# Patient Record
Sex: FEMALE | Race: WHITE
[De-identification: names, ages, dates, MRNs, and addresses within clinical notes are randomized per-mention and may not be internally consistent; named-entity substitution may affect disease eponyms.]

---

## 2018-06-11 NOTE — ER
Nurse's Notes                                                                                     

 [facility]                                                                

Name: Re Jeong                                                                                

Age: 73 yrs                                                                                       

Sex: Female                                                                                       

: 1945                                                                                   

MRN: [id number]                                                                                   

Arrival Date: 2018                                                                          

Time: 09:48                                                                                       

Account#: [id number]                                                                            

Bed 8                                                                                             

Private MD: Jcarlos Hammond F                                                                   

Diagnosis: Headache                                                                               

                                                                                                  

Presentation:                                                                                     

                                                                                             

09:50 Presenting complaint: Patient states: Headache x 2 days. Denies photosensitivity, N/V.  hb  

      Hx migraines, chronic headache. Today headache feels worse than her typical headache.       

      Was instructed by Dr. Hammond to come to ED. Transition of care: patient was not           

      received from another setting of care. Onset of symptoms was Anu 10, 2018. Risk            

      Assessment: Do you want to hurt yourself or someone else? Patient reports no desire to      

      harm self or others. Initial Sepsis Screen: Does the patient meet any 2 criteria? No.       

      Patient's initial sepsis screen is negative. Does the patient have a suspected source       

      of infection? No. Patient's initial sepsis screen is negative. Care prior to arrival:       

      None.                                                                                       

09:50 Method Of Arrival: Ambulatory                                                           hb  

09:50 Acuity: YADIRA 3                                                                           hb  

                                                                                                  

Triage Assessment:                                                                                

09:55 Headache History: The patient has had previous headaches and this one is more severe    sv  

      than previous episodes.                                                                     

                                                                                                  

Historical:                                                                                       

- Allergies:                                                                                      

09:55 No Known Allergies;                                                                     hb  

- Home Meds:                                                                                      

09:55 Magnesium Oxide Oral [Active]; Fish Oil oral oral [Active]; aspirin 325 mg Oral tab 1   hb  

      tab once daily [Active]; metoprolol tartrate 25 mg Oral tab 1 tab once daily [Active];      

      donepezil 23 mg oral tab 1 tab once daily [Active]; Namzaric 28-10 mg oral CSpX 1 cap       

      once daily [Active]; losartan 100 mg oral tab 1 tab once daily [Active]; levothyroxine      

      50 mcg tab 1 tab once daily [Active];                                                       

- PSHx:                                                                                           

09:55 Knee - LEFT; Heart stents; Shoulder - RIGHT; Cholecystectomy;                           hb  

                                                                                                  

- Immunization history:: Adult Immunizations up to date.                                          

- Social history:: Smoking status: Patient/guardian denies using tobacco.                         

- Ebola Screening: : No symptoms or risks identified at this time.                                

                                                                                                  

                                                                                                  

Screenin:57 Abuse screen: Denies threats or abuse. Denies injuries from another. Nutritional        sv  

      screening: No deficits noted. Tuberculosis screening: No symptoms or risk factors           

      identified. Fall Risk None identified.                                                      

                                                                                                  

Assessment:                                                                                       

09:55 General: Appears in no apparent distress. uncomfortable, Behavior is calm, cooperative, sv  

      appropriate for age. Pain: Complains of pain in forehead, right eye, right cheek, left      

      cheek and left eye Pain currently is 8 out of 10 on a pain scale. Quality of pain is        

      described as throbbing, Pain began 2-3 days ago. Is continuous, Current management - is     

      no interventions. Neuro: Level of Consciousness is awake, alert, obeys commands,            

      Oriented to person, place, time, situation, Moves all extremities. Full function Gait       

      is steady, Speech is normal, Facial symmetry appears normal. Respiratory: Respiratory       

      effort is even, unlabored, Respiratory pattern is regular, symmetrical. : No signs        

      and/or symptoms were reported regarding the genitourinary system. Derm: Skin is normal.     

      Musculoskeletal: No signs and/or symptoms reported regarding the musculoskeletal system.    

12:18 Reassessment: Patient appears in no apparent distress at this time. Patient is alert,   dm5 

      oriented x 3, equal unlabored respirations, skin warm/dry/pink.                             

                                                                                                  

Vital Signs:                                                                                      

09:52  / 76; Pulse 52; Resp 16; Temp 98.2; Pulse Ox 99% on R/A; Weight 68.95 kg; Height hb  

      5 ft. 5 in. (165.10 cm); Pain 8/10;                                                         

10:33  / 59; Pulse 52; Resp 18; Pulse Ox 95% on R/A;                                    sv  

12:18 Pain 4/10;                                                                              dm5 

09:52 Body Mass Index 25.29 (68.95 kg, 165.10 cm)                                             hb  

                                                                                                  

ED Course:                                                                                        

09:48 Patient arrived in ED.                                                                  sb2 

09:49 Jcarlos Hammond MD is Private Physician.                                              sb2 

09:52 Triage completed.                                                                       hb  

09:55 Arm band placed on left wrist.                                                          hb  

09:57 Judy Rodriguez RN is Primary Nurse.                                                  sv  

09:57 Patient has correct armband on for positive identification. Bed in low position. Adult  sv  

      w/ patient.                                                                                 

09:59 Deon Jeffries MD is Attending Physician.                                              kdr 

10:26 Chem 7 Sent.                                                                            sv  

10:26 CBC with Diff Sent.                                                                     sv  

10:33 Initial lab(s) drawn, by me, sent to lab. Inserted saline lock: 20 gauge in left        sv  

      antecubital area, using aseptic technique. Blood collected.                                 

10:46 CT completed. Patient tolerated procedure well. Patient moved to CT via stretcher.      mw3 

      Patient moved back from CT.                                                                 

10:49 CT Head Brain wo Cont In Process Unspecified.                                           EDMS

11:27 Jcarlos Hammond MD is Referral Physician.                                             kdr 

12:18 No provider procedures requiring assistance completed. IV discontinued, intact,         dm5 

      bleeding controlled, No redness/swelling at site. Pressure dressing applied.                

                                                                                                  

Administered Medications:                                                                         

12:13 Drug: traMADol 50 mg Route: PO;                                                         dm5 

12:18 Follow up: Response: No adverse reaction; Medication administered at discharge.         dm5 

                                                                                                  

                                                                                                  

Outcome:                                                                                          

11:27 Discharge ordered by MD.                                                                kdr 

12:18 Discharged to home ambulatory, with family.                                             dm5 

12:18 Condition: good                                                                             

12:18 Discharge instructions given to patient, family, Instructed on discharge instructions,      

      follow up and referral plans. medication usage, Demonstrated understanding of               

      instructions, follow-up care, medications, Prescriptions given X 2.                         

12:19 Patient left the ED.                                                                    dm5 

                                                                                                  

Signatures:                                                                                       

Dispatcher [employer]                           EDMS                                                 

Stephy Acosta, RN                    RN   dm5                                                  

Judy Rodriguez RN                    RN   Deon Garcia MD MD kdr Baxter, Heather, COCO                     RN   Sloane Orr                               sb2                                                  

Kierra Kaminski                             mw3                                                  

                                                                                                  

**************************************************************************************************

## 2018-06-11 NOTE — EDPHYS
Physician Documentation                                                                           

 [facility]                                                                

Name: Re Jeong                                                                                

Age: 73 yrs                                                                                       

Sex: Female                                                                                       

: 1945                                                                                   

MRN: [id number]                                                                                   

Arrival Date: 2018                                                                          

Time: 09:48                                                                                       

Account#: [id number]                                                                            

Bed 8                                                                                             

Private MD: Jcarlos Hammond F                                                                   

ED Physician Deon Jeffries                                                                       

HPI:                                                                                              

                                                                                             

10:11 This 73 yrs old  Female presents to ER via Ambulatory with complaints of       kdr 

      Headache.                                                                                   

10:11 The patient complains of pain to the forehead, right eye, right cheek, nose, left cheek kdr 

      and left eye. The patient describes the headache as aching, constant, a pressure,           

      unrelenting. Onset: The symptoms/episode began/occurred The is an ongoing HA for months     

      if not years that has become mildly worse in the last two days. Her last imaging was        

      five years ago. There has not been any abrupt changes in the severity or location of        

      the HA. it is persistently in her face - more upper than lower.. Associated signs and       

      symptoms: The patient has no apparent associated signs or symptoms. Severity of             

      symptoms: At its worst the pain was moderate, in the emergency department the pain is       

      unchanged. Headache History: Other More sever the last two days bu the patient does not     

      appear in any acute distress. She is sitting on the bedside comfortably. The symptoms       

      are alleviated by nothing. the symptoms are aggravated by nothing. The patient has          

      experienced similar episodes in the past, chronically. The patient has been recently        

      seen by a physician: Dr. Hammond.                                                          

                                                                                                  

Historical:                                                                                       

- Allergies:                                                                                      

09:55 No Known Allergies;                                                                     hb  

- Home Meds:                                                                                      

09:55 Magnesium Oxide Oral [Active]; Fish Oil oral oral [Active]; aspirin 325 mg Oral tab 1   hb  

      tab once daily [Active]; metoprolol tartrate 25 mg Oral tab 1 tab once daily [Active];      

      donepezil 23 mg oral tab 1 tab once daily [Active]; Namzaric 28-10 mg oral CSpX 1 cap       

      once daily [Active]; losartan 100 mg oral tab 1 tab once daily [Active]; levothyroxine      

      50 mcg tab 1 tab once daily [Active];                                                       

- PSHx:                                                                                           

09:55 Knee - LEFT; Heart stents; Shoulder - RIGHT; Cholecystectomy;                           hb  

                                                                                                  

- Immunization history:: Adult Immunizations up to date.                                          

- Social history:: Smoking status: Patient/guardian denies using tobacco.                         

- Ebola Screening: : No symptoms or risks identified at this time.                                

                                                                                                  

                                                                                                  

ROS:                                                                                              

10:11 Constitutional: Negative for fever, chills, and weight loss, Eyes: Negative for injury, kdr 

      pain, redness, and discharge, ENT: Negative for injury, pain, and discharge, Neck:          

      Negative for injury, pain, and swelling, Cardiovascular: Negative for chest pain,           

      palpitations, and edema, Respiratory: Negative for shortness of breath, cough,              

      wheezing, and pleuritic chest pain, Abdomen/GI: Negative for abdominal pain, nausea,        

      vomiting, diarrhea, and constipation, Back: Negative for injury and pain, : Negative      

      for injury, bleeding, discharge, and swelling, MS/Extremity: Negative for injury and        

      deformity, Skin: Negative for injury, rash, and discoloration, Psych: Negative for          

      depression, anxiety, suicide ideation, homicidal ideation, and hallucinations,              

      Allergy/Immunology: Negative for hives, rash, and allergies, Endocrine: Negative for        

      neck swelling, polydipsia, polyuria, polyphagia, and marked weight changes,                 

      Hematologic/Lymphatic: Negative for swollen nodes, abnormal bleeding, and unusual           

      bruising.                                                                                   

10:11 Neuro: Positive for headache, Negative for altered mental status, dizziness, gait           

      disturbance, hearing loss, loss of consciousness, numbness, seizure activity, speech        

      changes, syncope, near syncope, tingling, tinnitus, tremor, visual changes, weakness.       

                                                                                                  

Exam:                                                                                             

10:11 Constitutional:  This is a well developed, well nourished patient who is awake, alert,  kdr 

      and in no acute distress. Head/Face:  Normocephalic, atraumatic. Eyes:  Pupils equal        

      round and reactive to light, extra-ocular motions intact.  Lids and lashes normal.          

      Conjunctiva and sclera are non-icteric and not injected.  Cornea within normal limits.      

      Periorbital areas with no swelling, redness, or edema. Neck:  Trachea midline, no           

      thyromegaly or masses palpated, and no cervical lymphadenopathy.  Supple, full range of     

      motion without nuchal rigidity, or vertebral point tenderness.  No Meningismus.             

      Chest/axilla:  Normal chest wall appearance and motion.  Nontender with no deformity.       

      No lesions are appreciated. Cardiovascular:  Regular rate and rhythm with a normal S1       

      and S2.  No gallops, murmurs, or rubs.  Normal PMI, no JVD.  No pulse deficits.             

      Respiratory:  Lungs have equal breath sounds bilaterally, clear to auscultation and         

      percussion.  No rales, rhonchi or wheezes noted.  No increased work of breathing, no        

      retractions or nasal flaring. Abdomen/GI:  Soft, non-tender, with normal bowel sounds.      

      No distension or tympany.  No guarding or rebound.  No evidence of tenderness               

      throughout. Back:  No spinal tenderness.  No costovertebral tenderness.  Full range of      

      motion. Skin:  Warm, dry with normal turgor.  Normal color with no rashes, no lesions,      

      and no evidence of cellulitis. MS/ Extremity:  Pulses equal, no cyanosis.                   

      Neurovascular intact.  Full, normal range of motion. Neuro:  Awake and alert, GCS 15,       

      oriented to person, place, time, and situation.  Cranial nerves II-XII grossly intact.      

      Motor strength 5/5 in all extremities.  Sensory grossly intact.  Cerebellar exam            

      normal.  Normal gait. Psych:  Awake, alert, with orientation to person, place and time.     

       Behavior, mood, and affect are within normal limits.                                       

                                                                                                  

Vital Signs:                                                                                      

09:52  / 76; Pulse 52; Resp 16; Temp 98.2; Pulse Ox 99% on R/A; Weight 68.95 kg; Height hb  

      5 ft. 5 in. (165.10 cm); Pain 8/10;                                                         

10:33  / 59; Pulse 52; Resp 18; Pulse Ox 95% on R/A;                                    sv  

12:18 Pain 4/10;                                                                              dm5 

09:52 Body Mass Index 25.29 (68.95 kg, 165.10 cm)                                             hb  

                                                                                                  

MDM:                                                                                              

11:27 Patient medically screened.                                                             kdr 

11:29 Data reviewed: vital signs, nurses notes, lab test result(s), radiologic studies.       kdr 

      Counseling: I had a detailed discussion with the patient and/or guardian regarding: the     

      historical points, exam findings, and any diagnostic results supporting the                 

      discharge/admit diagnosis, lab results, radiology results, the need for outpatient          

      follow up. Physician consultation: Jcarlos Hammond MD was called at 11:20, was              

      contacted at 11:20, regarding consult, patient's condition, need to evaluate the            

      patient as soon as possible, outpatient follow-up, in 2-3 days, and will see patient in     

      office, in 2-3 days.                                                                        

11:30 Special discussion: I discussed with the patient/guardian in detail that at this point  kdr 

      there is no indication for admission to the hospital. It is understood, however, that       

      if the symptoms persist or worsen the patient needs to return immediately for               

      re-evaluation. Based on the history and exam findings, there is no indication for           

      further emergent testing or inpatient evaluation. I discussed with the patient/guardian     

      the need to see the neurologist for further evaluation of the symptoms. ED course: The      

      patient was stable in the ED and non-toxic in any way.                                      

                                                                                                  

                                                                                             

10:09 Order name: CBC with Diff                                                               kdr 

                                                                                             

10:09 Order name: Chem 7                                                                      kdr 

                                                                                             

10:09 Order name: CT Head Brain wo Cont; Complete Time: 11:22                                 kdr 

                                                                                             

10:10 Order name: CBC with Automated Diff; Complete Time: 11:22                               [facility]

                                                                                             

10:10 Order name: Basic Metabolic Panel; Complete Time: 11:22                                 [facility]

                                                                                             

12:17 Order name: Urine Dipstick--Ancillary (enter results)                                   ag  

                                                                                             

10:09 Order name: Urine Dipstick-Ancillary (obtain specimen); Complete Time: 12:09            [facility] 

                                                                                                  

Administered Medications:                                                                         

12:13 Drug: traMADol 50 mg Route: PO;                                                         dm5 

12:18 Follow up: Response: No adverse reaction; Medication administered at discharge.         dm5 

                                                                                                  

                                                                                                  

Disposition:                                                                                      

18 11:27 Discharged to Home. Impression: Headache.                                          

- Condition is Stable.                                                                            

- Discharge Instructions: General Headache Without Cause.                                         

- Prescriptions for Depakote 250 mg Oral Tablet, Delayed Release (E.C.) - take 1 tablet           

  by ORAL route At bedtime; 15 tablet. Tramadol 50 mg Oral Tablet - take 1 tablet by              

  ORAL route every 8 hours as needed; 12 tablet.                                                  

- Medication Reconciliation Form, Thank You Letter, Antibiotic Education, Prescription            

  Opioid Use form.                                                                                

- Follow up: Jcarlos Hammond MD; When: Today; Reason: If symptoms return, Further               

  diagnostic work-up, Recheck today's complaints, Continuance of care, Re-evaluation by           

  your physician.                                                                                 

- Problem is an ongoing problem.                                                                  

- Symptoms are unchanged.                                                                         

                                                                                                  

                                                                                                  

                                                                                                  

Signatures:                                                                                       

[facility]                                                 

Stephy Acosta, COCO                    RN   dm5                                                  

Deon Jeffries MD MD   [facility]                                                  

Ania Montejo RN RN                                                      

                                                                                                  

Corrections: (The following items were deleted from the chart)                                    

12:19 11:27 2018 11:27 Discharged to Home. Impression: Headache. Condition is Stable.   dm5 

      Forms are Medication Reconciliation Form, Thank You Letter, Antibiotic Education,           

      Prescription Opioid Use. Follow up: Jcarlos Hammond; When: Today; Reason: If symptoms       

      return, Further diagnostic work-up, Recheck today's complaints, Continuance of care,        

      Re-evaluation by your physician. Problem is an ongoing problem. Symptoms are unchanged.     

      kdr                                                                                         

                                                                                                  

**************************************************************************************************

## 2018-06-11 NOTE — RAD REPORT
EXAM DESCRIPTION:  CT - Head Brain Wo Cont - 6/11/2018 10:48 am

 

CLINICAL HISTORY:  Headache

 

COMPARISON:  None.

 

TECHNIQUE:  Computed axial tomography of the head was obtained. IV contrast was not requested.

 

All CT scans are performed using dose optimization technique as appropriate and may include automated
 exposure control or mA/KV adjustment according to patient size.

 

FINDINGS:  An intracranial  bleed is not seen .

 

The ventricles are normal in caliber.

 

No extra-axial fluid collection is noted.

 

Fluid within the sinuses/ mastoids is not seen.

 

IMPRESSION:  No acute intracranial abnormality is seen. If patient's symptoms persist  MRI of the bra
in would be recommended.

## 2018-11-22 NOTE — EDPHYS
Physician Documentation                                                                           

 [facility]                                                                

Name: Re Jeong                                                                                

Age: 73 yrs                                                                                       

Sex: Female                                                                                       

: 1945                                                                                   

MRN: [id number]                                                                                   

Arrival Date: 2018                                                                          

Time: 14:18                                                                                       

Account#: [id number]                                                                            

Bed 13                                                                                            

Private MD:                                                                                       

ED Physician Homar Lopez                                                                    

HPI:                                                                                              

                                                                                             

14:30 This 73 yrs old  Female presents to ER via Ambulatory with complaints of Rash. cp  

14:30 The patient's rash thought to be caused by an unknown cause. The rash is located on the cp  

      body diffusely. The rash can be described as erythematous, papular, urticarial. Onset:      

      The symptoms/episode began/occurred 1 week(s) ago. Associated signs and symptoms:           

      Pertinent positives: itching, Pertinent negatives: difficulty breathing, fever,             

      swelling of lips, swelling of throat, swelling of tongue. Severity of symptoms: in the      

      emergency department the symptoms are unchanged. Treatment given at home: steroid           

      lotion/cream.                                                                               

                                                                                                  

Historical:                                                                                       

- Allergies:                                                                                      

14:31 No Known Allergies;                                                                     hj  

- Home Meds:                                                                                      

14:31 aspirin 325 mg Oral tab 1 tab once daily [Active]; donepezil 23 mg Oral tab 1 tab once  hj  

      daily [Active]; Fish Oil Oral [Active]; levothyroxine 50 mcg tab 1 tab once daily           

      [Active]; losartan 100 mg Oral tab 1 tab once daily [Active]; Magnesium Oxide Oral          

      [Active]; metoprolol tartrate 25 mg Oral tab 1 tab once daily [Active]; Namzaric 28-10      

      mg Oral CSpX 1 cap once daily [Active];                                                     

- PSHx:                                                                                           

14:31 Knee - LEFT; Heart stents; Shoulder - RIGHT; Cholecystectomy;                           hj  

                                                                                                  

- Immunization history:: Adult Immunizations up to date.                                          

- Social history:: Smoking status: Patient/guardian denies using tobacco,                         

  Patient/guardian denies using alcohol.                                                          

- Ebola Screening: : Patient negative for fever greater than or equal to 101.5 degrees            

  Fahrenheit, and additional compatible Ebola Virus Disease symptoms Patient denies               

  exposure to infectious person Patient denies travel to an Ebola-affected area in the            

  21 days before illness onset.                                                                   

                                                                                                  

                                                                                                  

ROS:                                                                                              

14:33 Eyes: Negative for injury, pain, redness, and discharge.                                cp  

14:33 Constitutional: Negative for body aches, chills, fever, poor PO intake.                     

14:33 ENT: Negative for drainage from ear(s), ear pain, sore throat, difficulty swallowing,       

      difficulty handling secretions.                                                             

14:33 Cardiovascular: Negative for chest pain, edema, palpitations.                               

14:33 Respiratory: Negative for cough, shortness of breath, wheezing.                             

14:33 Abdomen/GI: Negative for abdominal pain, nausea, vomiting, and diarrhea, black/tarry        

      stool, rectal bleeding.                                                                     

14:33 Skin: Positive for rash, diffusely, Negative for abscesses, cellulitis.                     

14:33 Neuro: Negative for altered mental status, headache, weakness.                              

14:33 All other systems are negative.                                                             

                                                                                                  

Exam:                                                                                             

14:35 Head/Face:  Normocephalic, atraumatic.                                                  cp  

14:35 Constitutional: The patient appears in no acute distress, alert, awake,                     

      non-diaphoretic, non-toxic, well developed, well nourished.                                 

14:35 Eyes: Periorbital structures: appear normal, Conjunctiva: normal, no exudate, no        cp  

      injection, Sclera: no appreciated abnormality, Lids and lashes: appear normal,              

      bilaterally.                                                                                

14:35 ENT: External ear(s): are unremarkable, Nose: is normal, Mouth: is normal, Posterior        

      pharynx: is normal, airway is patent.                                                       

14:35 Neck: ROM/movement: is normal, is supple, without pain, no range of motions                 

      limitations, no nuchal rigidity.                                                            

14:35 Chest/axilla: Palpation: is normal, no crepitus, no tenderness.                             

14:35 Cardiovascular: Rate: normal.                                                               

14:35 Respiratory: the patient does not display signs of respiratory distress,  Respirations:     

      normal, no use of accessory muscles, no retractions, no splinting, no tachypnea,            

      labored breathing, is not present.                                                          

14:35 Abdomen/GI: Exam negative for discomfort, distension, guarding.                             

14:35 Skin: consistent with  urticaria, areas appear excoriated with no signs of erythema or      

      drainage.                                                                                   

                                                                                                  

Vital Signs:                                                                                      

14:31  / 90; Pulse 60; Resp 18; Temp 97.6(TE); Pulse Ox 100% on R/A; Weight 72.57 kg;   hj  

      Height 5 ft. 5 in. (165.10 cm); Pain 0/10;                                                  

14:31 Body Mass Index 26.63 (72.57 kg, 165.10 cm)                                               

                                                                                                  

MDM:                                                                                              

14:24 Patient medically screened.                                                             cp  

14:35 Differential diagnosis: impetigo, varicella, allergic reaction, scabies.                cp  

14:38 Data reviewed: vital signs, nurses notes, and as a result, I will discharge patient.    cp  

                                                                                                  

Administered Medications:                                                                         

No medications were administered                                                                  

                                                                                                  

                                                                                                  

Disposition:                                                                                      

18:35 Co-signature as Attending Physician, Homar Lopez MD.                               ma2 

                                                                                                  

Disposition:                                                                                      

18 14:39 Discharged to Home. Impression: Urticaria, unspecified.                            

- Condition is Stable.                                                                            

- Discharge Instructions: Hives.                                                                  

- Prescriptions for Pepcid 20 mg Oral Tablet - take 1 tablet by ORAL route every 12               

  hours for 5 days; 10 tablet. Triamcinolone Acetonide 0.5 % Topical Cream - apply 1              

  application by TOPICAL route 2 times per day As needed may apply cream to areas of              

  rash except face; 1 tube. Prednisone 20 mg Oral Tablet - take 2 tablet by ORAL route            

  once daily for 5 days; 10 tablet.                                                               

- Medication Reconciliation Form, Thank You Letter, Antibiotic Education, Prescription            

  Opioid Use form.                                                                                

- Follow up: Private Physician; When: 5 - 6 days; Reason: Recheck today's complaints.             

- Problem is new.                                                                                 

- Symptoms are unchanged.                                                                         

                                                                                                  

                                                                                                  

                                                                                                  

Signatures:                                                                                       

Waldo Upton RN                      RN   hj                                                   

Joe Gibson PA                         PA   Homar Kern MD MD   ma2                                                  

                                                                                                  

Corrections: (The following items were deleted from the chart)                                    

14:49 14:39 2018 14:39 Discharged to Home. Impression: Urticaria, unspecified.          hj  

      Condition is Stable. Forms are Medication Reconciliation Form, Thank You Letter,            

      Antibiotic Education, Prescription Opioid Use. Follow up: Private Physician; When: 5 -      

      6 days; Reason: Recheck today's complaints. Problem is new. Symptoms are unchanged. cp      

                                                                                                  

**************************************************************************************************

## 2018-11-22 NOTE — ER
Nurse's Notes                                                                                     

 [facility]                                                                

Name: Re Jeong                                                                                

Age: 73 yrs                                                                                       

Sex: Female                                                                                       

: 1945                                                                                   

MRN: [id number]                                                                                   

Arrival Date: 2018                                                                          

Time: 14:18                                                                                       

Account#: [id number]                                                                            

Bed 13                                                                                            

Private MD:                                                                                       

Diagnosis: Urticaria, unspecified                                                                 

                                                                                                  

Presentation:                                                                                     

                                                                                             

14:28 Presenting complaint: Patient states: i started having itchy type of rash on my face,   hj  

      neck, arms that started a weeks ago; denies fever and chills;. Transition of care:          

      patient was not received from another setting of care. Onset of symptoms was 2018. Risk Assessment: Do you want to hurt yourself or someone else? Patient            

      reports no desire to harm self or others. Initial Sepsis Screen: Does the patient meet      

      any 2 criteria? No. Patient's initial sepsis screen is negative. Does the patient have      

      a suspected source of infection? No. Patient's initial sepsis screen is negative. Care      

      prior to arrival: None.                                                                     

14:28 Method Of Arrival: Ambulatory                                                             

14:28 Acuity: YADIRA 4                                                                           hj  

                                                                                                  

Triage Assessment:                                                                                

14:32 General: Appears in no apparent distress. uncomfortable, Behavior is calm, cooperative, hj  

      appropriate for age. Pain: Denies pain.                                                     

                                                                                                  

Historical:                                                                                       

- Allergies:                                                                                      

14:31 No Known Allergies;                                                                     hj  

- Home Meds:                                                                                      

14:31 aspirin 325 mg Oral tab 1 tab once daily [Active]; donepezil 23 mg Oral tab 1 tab once  hj  

      daily [Active]; Fish Oil Oral [Active]; levothyroxine 50 mcg tab 1 tab once daily           

      [Active]; losartan 100 mg Oral tab 1 tab once daily [Active]; Magnesium Oxide Oral          

      [Active]; metoprolol tartrate 25 mg Oral tab 1 tab once daily [Active]; Namzaric 28-10      

      mg Oral CSpX 1 cap once daily [Active];                                                     

- PSHx:                                                                                           

14:31 Knee - LEFT; Heart stents; Shoulder - RIGHT; Cholecystectomy;                           hj  

                                                                                                  

- Immunization history:: Adult Immunizations up to date.                                          

- Social history:: Smoking status: Patient/guardian denies using tobacco,                         

  Patient/guardian denies using alcohol.                                                          

- Ebola Screening: : Patient negative for fever greater than or equal to 101.5 degrees            

  Fahrenheit, and additional compatible Ebola Virus Disease symptoms Patient denies               

  exposure to infectious person Patient denies travel to an Ebola-affected area in the            

  21 days before illness onset.                                                                   

                                                                                                  

                                                                                                  

Screenin:32 Abuse screen: Denies threats or abuse. Denies injuries from another. Nutritional        hj  

      screening: No deficits noted. Tuberculosis screening: No symptoms or risk factors           

      identified. Fall Risk None identified.                                                      

                                                                                                  

Assessment:                                                                                       

14:33 General: Appears in no apparent distress. uncomfortable, Behavior is calm, cooperative, hj  

      appropriate for age. Pain: Denies pain. Neuro: Level of Consciousness is awake, alert,      

      obeys commands, Oriented to person, place, time, situation, Appropriate for age.            

      Cardiovascular: Capillary refill < 3 seconds Patient's skin is warm and dry.                

      Respiratory: Airway is patent Respiratory effort is even, unlabored, Respiratory            

      pattern is regular, symmetrical. GI: No signs and/or symptoms were reported involving       

      the gastrointestinal system. GI: No signs and/or symptoms were reported involving the       

      gastrointestinal system. : No signs and/or symptoms were reported regarding the           

      genitourinary system. EENT: No signs and/or symptoms were reported regarding the EENT       

      system. Derm: Rash noted that is. Musculoskeletal: No signs and/or symptoms reported        

      regarding the musculoskeletal system.                                                       

                                                                                                  

Vital Signs:                                                                                      

14:31  / 90; Pulse 60; Resp 18; Temp 97.6(TE); Pulse Ox 100% on R/A; Weight 72.57 kg;   hj  

      Height 5 ft. 5 in. (165.10 cm); Pain 0/10;                                                  

14:31 Body Mass Index 26.63 (72.57 kg, 165.10 cm)                                             hj  

                                                                                                  

ED Course:                                                                                        

14:18 Patient arrived in ED.                                                                  ds1 

14:24 Joe Gibson PA is PHCP.                                                                cp  

14:24 Homar Lopez MD is Attending Physician.                                           cp  

14:27 Waldo Upton, RN is Primary Nurse.                                                    hj  

14:29 Triage completed.                                                                       hj  

14:32 Arm band placed on left wrist.                                                          hj  

14:32 Patient has correct armband on for positive identification. Bed in low position. Call   hj  

      light in reach. Side rails up X 1. Adult w/ patient.                                        

14:49 No provider procedures requiring assistance completed. Patient did not have IV access   hj  

      during this emergency room visit.                                                           

                                                                                                  

Administered Medications:                                                                         

No medications were administered                                                                  

                                                                                                  

                                                                                                  

Outcome:                                                                                          

14:39 Discharge ordered by MD.                                                                cp  

14:49 Discharged to home ambulatory, with family.                                             hj  

14:49 Condition: stable                                                                           

14:49 Discharge instructions given to patient, family, Instructed on discharge instructions,      

      follow up and referral plans. medication usage, Demonstrated understanding of               

      instructions, follow-up care, medications, Prescriptions given X 3.                         

14:49 Patient left the ED.                                                                    lorenzo  

                                                                                                  

Signatures:                                                                                       

Edelmira Leon                                ds1                                                  

Waldo Upton, RN                      RN   Joe White PA                         PA   cp                                                   

                                                                                                  

**************************************************************************************************

## 2022-01-21 ENCOUNTER — HOSPITAL ENCOUNTER (INPATIENT)
Dept: HOSPITAL 97 - ER | Age: 77
LOS: 5 days | Discharge: HOME HEALTH | DRG: 195 | End: 2022-01-26
Attending: INTERNAL MEDICINE | Admitting: INTERNAL MEDICINE
Payer: COMMERCIAL

## 2022-01-21 VITALS — BODY MASS INDEX: 18.8 KG/M2

## 2022-01-21 DIAGNOSIS — F02.80: ICD-10-CM

## 2022-01-21 DIAGNOSIS — Z79.899: ICD-10-CM

## 2022-01-21 DIAGNOSIS — Z79.890: ICD-10-CM

## 2022-01-21 DIAGNOSIS — E87.6: ICD-10-CM

## 2022-01-21 DIAGNOSIS — J15.9: Primary | ICD-10-CM

## 2022-01-21 DIAGNOSIS — Z20.822: ICD-10-CM

## 2022-01-21 DIAGNOSIS — R77.8: ICD-10-CM

## 2022-01-21 DIAGNOSIS — Z79.52: ICD-10-CM

## 2022-01-21 DIAGNOSIS — Z79.82: ICD-10-CM

## 2022-01-21 DIAGNOSIS — G30.9: ICD-10-CM

## 2022-01-21 DIAGNOSIS — I27.20: ICD-10-CM

## 2022-01-21 DIAGNOSIS — Z87.891: ICD-10-CM

## 2022-01-21 LAB
ALBUMIN SERPL BCP-MCNC: 2.8 G/DL (ref 3.4–5)
ALP SERPL-CCNC: 109 U/L (ref 45–117)
ALT SERPL W P-5'-P-CCNC: 17 U/L (ref 12–78)
AST SERPL W P-5'-P-CCNC: 57 U/L (ref 15–37)
BUN BLD-MCNC: 16 MG/DL (ref 7–18)
GLUCOSE SERPLBLD-MCNC: 118 MG/DL (ref 74–106)
HCT VFR BLD CALC: 42.5 % (ref 36–45)
INR BLD: 1.6
LYMPHOCYTES # SPEC AUTO: 0.5 K/UL (ref 0.7–4.9)
MAGNESIUM SERPL-MCNC: 2 MG/DL (ref 1.8–2.4)
NT-PROBNP SERPL-MCNC: (no result) PG/ML (ref ?–450)
PMV BLD: 9.5 FL (ref 7.6–11.3)
POTASSIUM SERPL-SCNC: 2.5 MMOL/L (ref 3.5–5.1)
RBC # BLD: 5.08 M/UL (ref 3.86–4.86)
SARS-COV-2 RNA RESP QL NAA+PROBE: NEGATIVE

## 2022-01-21 PROCEDURE — 97110 THERAPEUTIC EXERCISES: CPT

## 2022-01-21 PROCEDURE — 70450 CT HEAD/BRAIN W/O DYE: CPT

## 2022-01-21 PROCEDURE — 94640 AIRWAY INHALATION TREATMENT: CPT

## 2022-01-21 PROCEDURE — 97161 PT EVAL LOW COMPLEX 20 MIN: CPT

## 2022-01-21 PROCEDURE — 83605 ASSAY OF LACTIC ACID: CPT

## 2022-01-21 PROCEDURE — 82553 CREATINE MB FRACTION: CPT

## 2022-01-21 PROCEDURE — 80053 COMPREHEN METABOLIC PANEL: CPT

## 2022-01-21 PROCEDURE — 96365 THER/PROPH/DIAG IV INF INIT: CPT

## 2022-01-21 PROCEDURE — 71045 X-RAY EXAM CHEST 1 VIEW: CPT

## 2022-01-21 PROCEDURE — 87040 BLOOD CULTURE FOR BACTERIA: CPT

## 2022-01-21 PROCEDURE — 93970 EXTREMITY STUDY: CPT

## 2022-01-21 PROCEDURE — 97116 GAIT TRAINING THERAPY: CPT

## 2022-01-21 PROCEDURE — 0241U: CPT

## 2022-01-21 PROCEDURE — 82607 VITAMIN B-12: CPT

## 2022-01-21 PROCEDURE — 85379 FIBRIN DEGRADATION QUANT: CPT

## 2022-01-21 PROCEDURE — 97530 THERAPEUTIC ACTIVITIES: CPT

## 2022-01-21 PROCEDURE — 94760 N-INVAS EAR/PLS OXIMETRY 1: CPT

## 2022-01-21 PROCEDURE — 80320 DRUG SCREEN QUANTALCOHOLS: CPT

## 2022-01-21 PROCEDURE — 71275 CT ANGIOGRAPHY CHEST: CPT

## 2022-01-21 PROCEDURE — 83735 ASSAY OF MAGNESIUM: CPT

## 2022-01-21 PROCEDURE — 82550 ASSAY OF CK (CPK): CPT

## 2022-01-21 PROCEDURE — 80076 HEPATIC FUNCTION PANEL: CPT

## 2022-01-21 PROCEDURE — 99285 EMERGENCY DEPT VISIT HI MDM: CPT

## 2022-01-21 PROCEDURE — 85610 PROTHROMBIN TIME: CPT

## 2022-01-21 PROCEDURE — 84443 ASSAY THYROID STIM HORMONE: CPT

## 2022-01-21 PROCEDURE — 36415 COLL VENOUS BLD VENIPUNCTURE: CPT

## 2022-01-21 PROCEDURE — 85025 COMPLETE CBC W/AUTO DIFF WBC: CPT

## 2022-01-21 PROCEDURE — 96368 THER/DIAG CONCURRENT INF: CPT

## 2022-01-21 PROCEDURE — 85049 AUTOMATED PLATELET COUNT: CPT

## 2022-01-21 PROCEDURE — 96375 TX/PRO/DX INJ NEW DRUG ADDON: CPT

## 2022-01-21 PROCEDURE — 83880 ASSAY OF NATRIURETIC PEPTIDE: CPT

## 2022-01-21 PROCEDURE — 93005 ELECTROCARDIOGRAM TRACING: CPT

## 2022-01-21 PROCEDURE — 80329 ANALGESICS NON-OPIOID 1 OR 2: CPT

## 2022-01-21 PROCEDURE — 93306 TTE W/DOPPLER COMPLETE: CPT

## 2022-01-21 PROCEDURE — 96372 THER/PROPH/DIAG INJ SC/IM: CPT

## 2022-01-21 PROCEDURE — 84145 PROCALCITONIN (PCT): CPT

## 2022-01-21 PROCEDURE — 80048 BASIC METABOLIC PNL TOTAL CA: CPT

## 2022-01-21 PROCEDURE — 84484 ASSAY OF TROPONIN QUANT: CPT

## 2022-01-21 NOTE — RAD REPORT
EXAM DESCRIPTION:  CT - Head Brain Wo Cont - 1/21/2022 8:39 pm

 

CLINICAL HISTORY:  CONFUSED

 

COMPARISON:  Head Brain Wo Cont dated 6/11/2018

 

TECHNIQUE:  All CT scans are performed using dose optimization technique as appropriate and may inclu
de automated exposure control or mA/KV adjustment according to patient size.

 

FINDINGS:  No intracranial hemorrhage, hydrocephalus or extra-axial fluid collection.No areas of brai
n edema or evidence of midline shift. Cerebral atrophy. Mild chronic small vessel ischemic changes.

 

The paranasal sinuses and mastoids are clear. The calvarium is intact.

 

IMPRESSION:  No acute intracranial abnormality.

## 2022-01-21 NOTE — RAD REPORT
EXAM DESCRIPTION:  RAD - Chest Single View - 1/21/2022 7:44 pm

 

CLINICAL HISTORY:  AMS

 

COMPARISON:  No comparisons

 

FINDINGS:  Lines: None.

Lungs: Emphysematous changes. Volume loss in the right lung with scattered irregular opacities and br
onchial wall thickening.

Pleural: Rounded nodule right mid lung may be fluid trapped within the minor fissure but is nonspecif
ic.

Cardiac: Cardiomegaly.

Bones: No acute fractures.

Other:

 

IMPRESSION:  Primarily chronic finding suspected with emphysema but other areas of more irregular air
space disease that could represent superimposed acute pneumonia.

 

Right mid lung nodular opacity. Consider nonemergent chest CT for further characterization.

## 2022-01-21 NOTE — XMS REPORT
Continuity of Care Document

                           Created on:2022



Patient:KARIE GUZMAN

Sex:Female

:1945

External Reference #:[id number]





Demographics







                          Address                   [address] [place] [address]

 

                          Home Phone                [phone]

 

                          Mobile Phone              [phone]

 

                          Email Address             [email or web address]

 

                          Preferred Language        English

 

                          Marital Status            Unknown

 

                          Scientologist Affiliation     Unknown

 

                          Race                      Unknown

 

                          Additional Race(s)        White



                                                    Unavailable

 

                          Ethnic Group              Not  or 









Author







                          Organization              [facility]

t

 

                          Address                   [address]



                                                    [place] [address]

 

                          Phone                     [phone]









Support







                Name            Relationship    Address         Phone

 

                Marie           Child           [address]    [phone]



                                                [place] [address] 

 

                Jean-Paul          Child           Unavailable     [phone]









Care Team Providers







                    Name                Role                Phone

 

                    Danii Bass  Attending Clinician [phone]

 

                    Doctor Unassigned,  Name Attending Clinician Unavailable









Problems

This patient has no known problems.



Allergies, Adverse Reactions, Alerts

This patient has no known allergies or adverse reactions.



Medications

This patient has no known medications.



Procedures

This patient has no known procedures.



Encounters







        Start   End     Encounter Admission Attending Care    Care    Encounter 

Source



        Date/Time Date/Time Type    Type    Clinicians Facility Department ID   

   

 

        2019 Emergency         Talat, K RUST    [id number]

[id number] 



        11:17:15 13:29:00                 Danii Campuzano [id number]         



                                                [place] [id number]         



                                                [place]  [phone]         



                                                        4             

 

        2019 Orders          Doctor JAY    [id number]

79 



        00:00:00 00:00:00 Only            UnassignedCHRIS   [id number]       

  



                                        [facility] [id number]         



                                                        [phone]         



                                                        009             







Results

This patient has no known results.

## 2022-01-21 NOTE — ER
Nurse's Notes                                                                                     

 [facility]                                                                 

Name: Re Jeong                                                                                

Age: 76 yrs                                                                                       

Sex: Female                                                                                       

: 1945                                                                                   

MRN: [id number]                                                                                   

Arrival Date: 2022                                                                          

Time: 18:33                                                                                       

Account#: [id number]                                                                            

Bed 7                                                                                             

Private MD:                                                                                       

Diagnosis: Altered mental status, unspecified;Hypokalemia;Other pneumonia, unspecified organism   

                                                                                                  

Presentation:                                                                                     

                                                                                             

18:34 Chief complaint: Patient states: Weakness (18 hours of laying on couch), dizzy, and     ll1 

      acting abnormal per family today. EMS states: /80,  A fib with history of       

      the same. Fingerstick 86. Coronavirus screen: Client denies travel out of the [place] in       

      the last 14 days. Ebola Screen: Patient denies travel to an Ebola-affected area in the      

      21 days before illness onset. Initial Sepsis Screen: Does the patient meet any 2            

      criteria? HR > 90 bpm. No. Patient's initial sepsis screen is negative. Does the            

      patient have a suspected source of infection? No. Patient's initial sepsis screen is        

      negative. Risk Assessment: Do you want to hurt yourself or someone else? Patient            

      reports no desire to harm self or others. Onset of symptoms was 2022.           

18:34 Method Of Arrival: EMS                                                                  ll1 

18:34 Acuity: YADIRA 3                                                                           ll1 

                                                                                                  

Historical:                                                                                       

- Allergies:                                                                                      

18:36 No Known Allergies;                                                                     ll1 

- PMHx:                                                                                           

18:36 Dementia; Hypothyroidism; Hypertensive disorder;                                        ll1 

- PSHx:                                                                                           

18:36 Unable to Obtain;                                                                       ll1 

                                                                                                  

- Immunization history:: Adult Immunizations unknown.                                             

- Social history:: Smoking status: Patient denies any tobacco usage or history of.                

                                                                                                  

                                                                                                  

Screenin:55 Abuse screen: Denies threats or abuse. Nutritional screening: patient has decreased     ap3 

      appetite last 24 hours. Tuberculosis screening: No symptoms or risk factors identified.     

      Fall Risk Fall in past 12 months (25 points). Secondary diagnosis (15 points) dementia,     

      No IV (0 pts). Ambulatory Aid- None/Bed Rest/Nurse Assist (0 pts). Gait- Weak (10           

      pts.). Mental Status- Overestimates/Forgets Limitations (15 pts.). Total Jiang Fall         

      Scale indicates High Risk Score (45 or more points). Fall prevention measures have been     

      instituted. Side Rails Up X 2 Placed Close to Nursing Station Frequent Obs/Assessments      

      Occuring As available patient and family educated on Fall Prevention Program and            

      Strategies.                                                                                 

                                                                                                  

Assessment:                                                                                       

18:54 General: Appears comfortable, Behavior is calm. Pain: Denies pain. Neuro: Level of      ap3 

      Consciousness is awake, alert, obeys commands, Oriented to person, place, Weakness          

      Speech is normal. Cardiovascular: Patient's skin is warm and dry. Respiratory: Airway       

      is patent Respiratory effort is even, unlabored. Musculoskeletal: Parent/caregiver          

      report the patient having weakness in raffaele lower extremities.                                

19:30 General: Appears in no apparent distress. comfortable, Behavior is calm, cooperative.   al4 

      Pain: Denies pain. Neuro: Level of Consciousness is awake, alert, obeys commands,           

      Oriented to person, place. Cardiovascular: Heart tones present Capillary refill < 3         

      seconds Patient's skin is warm and dry. Respiratory: Airway is patent Respiratory           

      effort is even, unlabored, Respiratory pattern is tachypnea Breath sounds with rhonchi      

      bilaterally. GI: No signs and/or symptoms were reported involving the gastrointestinal      

      system. : No signs and/or symptoms were reported regarding the genitourinary system.      

      EENT: No signs and/or symptoms were reported regarding the EENT system. Derm: No signs      

      and/or symptoms reported regarding the dermatologic system. Musculoskeletal:                

      Circulation, motion, and sensation intact.                                                  

21:00 Reassessment: Patient is alert, oriented x 3, equal unlabored respirations, skin        al4 

      warm/dry/pink.                                                                              

23:00 Reassessment: Patient is alert, oriented x 3, equal unlabored respirations, skin        al4 

      warm/dry/pink.                                                                              

                                                                                             

01:28 Reassessment: Alfred (Son) [phone].                                                  al4 

01:35 Reassessment: Son just left to go home. He is updated on the plan of care and all       al4 

      questions have been answered. Patient is awake and alert.                                   

02:23 Reassessment: Patient is alert, oriented x 3, equal unlabored respirations, skin        al4 

      warm/dry/pink.                                                                              

                                                                                                  

Vital Signs:                                                                                      

                                                                                             

18:43  / 80; Pulse 118; Resp 17; Temp 98.0; Pulse Ox 98% on R/A;                        ll1 

18:52  / 84; Pulse 104; Resp 19; Temp 98.2(TE); Pulse Ox 97% on R/A;                    ap3 

20:00  / 70; Pulse 101; Resp 28; Pulse Ox 90% ;                                         al4 

23:00  / 65; Pulse 89; Resp 25; Pulse Ox 96% on 4 lpm NC;                               al4 

                                                                                             

00:00  / 65; Pulse 96; Resp 24; Pulse Ox 98% on 4 lpm NC;                               al4 

01:00  / 74; Pulse 86; Resp 24; Pulse Ox 100% on 4 lpm NC;                              al4 

01:45  / 84; Pulse 100; Resp 24; Pulse Ox 100% on 4 lpm NC;                             al4 

02:15  / 69; Pulse 87; Resp 24; Pulse Ox 100% on 4 lpm NC;                              al4 

02:34 Weight 52.16 kg (R);                                                                    al4 

                                                                                                  

ED Course:                                                                                        

                                                                                             

18:33 Patient arrived in ED.                                                                  lynsey 

18:36 Triage completed.                                                                       ll1 

18:36 Arm band placed on.                                                                     ll1 

18:56 Patient has correct armband on for positive identification. Bed in low position. Call   ap3 

      light in reach. Side rails up X2. Cardiac monitor on. Pulse ox on. NIBP on. Warm            

      blanket given.                                                                              

19:00 Jeronimo Houser MD is Attending Physician.                                             mh7 

19:43 XRAY Chest (1 view) In Process Unspecified.                                             EDMS

20:39 CT Head Brain wo Cont In Process Unspecified.                                           EDMS

21:28 Laura Rodriguez, COCO is Primary Nurse.                                                   st1 

21:29 ETOH Level Sent.                                                                        st1 

21:29 Acetaminophen Sent.                                                                     st1 

21:29 COVID-19/FLU A+B/RSV (Document "Date of Onset" if Symptomatic) Sent.                    st1 

21:29 Lactate Sent.                                                                           st1 

21:29 Blood Culture Adult (2) Sent.                                                           st1 

21:30 Basic Metabolic Panel Sent.                                                             st1 

21:30 CBC with Diff Sent.                                                                     st1 

21:30 LFT's Sent.                                                                             st1 

21:30 Troponin HS Sent.                                                                       st1 

21:30 Magnesium Sent.                                                                         st1 

21:30 NT PRO-BNP Sent.                                                                        st1 

21:30 PT-INR Sent.                                                                            st1 

21:30 Inserted saline lock: 20 gauge in right antecubital area, using aseptic technique.      st1 

21:31 Oxygen administration via nasal cannula \T\ 2L/min.                                       st1 

21:31 Initial lab(s) drawn, by me, First set of blood cultures drawn by me, Second set of     st1 

      blood cultures drawn by me, Urine collected: COVID swab sent to lab.                        

21:32 Procalcitonin Sent.                                                                     st1 

22:23 Juan Blank MD is Hospitalizing Provider.                                          7 

22:35 Jacobo Cuevas MD is Hospitalizing Provider.                                            7 

22:58 CT Chest For PE Angio In Process Unspecified.                                           EDMS

                                                                                             

02:45 No provider procedures requiring assistance completed.                                  st1 

02:45 IV discontinued, R AC, accidentally by patient.                                         al4 

                                                                                                  

Administered Medications:                                                                         

01:25 Drug: Potassium Effervescent Tablet 50 mEq Route: PO;                                   al4 

02:30 Follow up: Response: No adverse reaction                                                al4 

01:25 Drug: Rocephin (cefTRIAXone) 1 grams Route: IV; Rate: calculated rate; Site: right      al4 

      antecubital;                                                                                

02:30 Follow up: Response: No adverse reaction; IV Status: Completed infusion                 al4 

01:25 Drug: Zithromax (azithromycin) 500 mg Route: IVPB; Infused Over: 1 hrs; Site: right     al4 

      antecubital;                                                                                

02:31 Follow up: Response: No adverse reaction; IV Status: Completed infusion                 al4 

01:33 Drug: Potassium Chloride 20 mEq Route: IV; Rate: calculated rate; Site: right           al4 

      antecubital;                                                                                

03:04 Drug: Lovenox (enoxaparin) 1 mg/kg {Note: Given by Laura Rodriguez RN .} Route: Sub-Q;  al4 

      Site: abdomen;                                                                              

                                                                                                  

                                                                                                  

Outcome:                                                                                          

                                                                                             

22:24 Decision to Hospitalize by Provider.                                                    [facility] 

                                                                                             

02:54 Admitted to Med/surg accompanied by tech, via stretcher, room 229, with chart, Report   st1 

      called to  ZAHRA Shannon                                                                        

      Condition: stable                                                                           

      Instructed on the need for admit.                                                           

03:32 Patient left the ED.                                                                    st1 

                                                                                                  

Signatures:                                                                                       

Dispatcher [employer]                           EDJoe Browne MD MD cha Prokisch, Amanda, RN                    RN   ap3                                                  

Chanell Bonner RN                       RN   ll1                                                  

Jeronimo Houser MD MD   7                                                  

Romeo Ga                            al4                                                  

Laura Rodriguez RN                     RN   st1                                                  

                                                                                                  

Corrections: (The following items were deleted from the chart)                                    

                                                                                             

21:30 21:29 URINE DRUG SCREEN+CHEM UR.LAB.BRZ drawn and sent. st1                             EDMS

                                                                                             

02: 02:25 General: Appears al4                                                              al4 

: 19:30 Cardiovascular: Capillary refill < 3 seconds Patient's skin is warm and     al4 

      dry. al4                                                                                    

                                                                                             

02: 19:30 Respiratory: Airway is patent Respiratory effort is even, unlabored,        al4 

      Respiratory pattern is regular, tachypnea al4                                               

                                                                                             

02:51  19:30 Respiratory: Airway is patent Respiratory effort is even, unlabored,        al4 

      Respiratory pattern is regular, tachypnea Breath sounds with rhonchi bilaterally. al4       

                                                                                                  

**************************************************************************************************

## 2022-01-21 NOTE — EDPHYS
Physician Documentation                                                                           

 [facility]                                                                 

Name: Re Jeong                                                                                

Age: 76 yrs                                                                                       

Sex: Female                                                                                       

: 1945                                                                                   

MRN: [id number]                                                                                   

Arrival Date: 2022                                                                          

Time: 18:33                                                                                       

Account#: [id number]                                                                            

Bed 7                                                                                             

Private MD:                                                                                       

ED Physician Jeronimo Houser                                                                      

HPI:                                                                                              

                                                                                             

19:16 This 76 yrs old Female presents to ER via EMS with complaints of Altered Mental Status. mh7 

19:16 The patient presents with confusion. Onset: The symptoms/episode began/occurred         mh7 

      yesterday, at an unknown time.                                                              

19:16 Possible causes: unknown.                                                               mh7 

19:16 Associated signs and symptoms: Pertinent positives: confusion, dizziness, weakness,     mh7 

      Pertinent negatives: abdominal pain, agitation, ataxia, blurred vision, chest pain,         

      combativeness, diaphoresis, diarrhea, headache, nausea, numbness, palpitations,             

      seizure, shortness of breath, tingling, vertigo, vomiting. Current symptoms: In the         

      emergency department the patient's symptoms are unchanged from the initial                  

      presentation. Patient's baseline: Neuro: alert but confused, Motor: no deficits,            

      Speech: normal for age, The patient has a previous history of Dementia. According to        

      EMS family had patient brought to ED for evaluation due to increased confusion,             

      dizziness, and weakness. They reported the patient had been laying on sofa for the past     

      18 hours. She has a history of dementia. Patient denies any complaints at time of           

      evaluation in ED..                                                                          

                                                                                                  

Historical:                                                                                       

- Allergies:                                                                                      

18:36 No Known Allergies;                                                                     ll1 

- PMHx:                                                                                           

18:36 Dementia; Hypothyroidism; Hypertensive disorder;                                        ll1 

- PSHx:                                                                                           

18:36 Unable to Obtain;                                                                       ll1 

                                                                                                  

- Immunization history:: Adult Immunizations unknown.                                             

- Social history:: Smoking status: Patient denies any tobacco usage or history of.                

                                                                                                  

                                                                                                  

ROS:                                                                                              

19:16 Constitutional: Negative for fever, chills, and weight loss, Eyes: Negative for injury, mh7 

      pain, redness, and discharge, ENT: Negative for injury, pain, and discharge, Neck:          

      Negative for injury, pain, and swelling, Cardiovascular: Negative for chest pain,           

      palpitations, and edema, Respiratory: Negative for shortness of breath, cough,              

      wheezing, and pleuritic chest pain, Abdomen/GI: Negative for abdominal pain, nausea,        

      vomiting, diarrhea, and constipation, Back: Negative for injury and pain, : Negative      

      for injury, bleeding, discharge, and swelling, MS/Extremity: Negative for injury and        

      deformity, Skin: Negative for injury, rash, and discoloration, Psych: Negative for          

      depression, anxiety, suicide ideation, homicidal ideation, and hallucinations,              

      Allergy/Immunology: Negative for hives, rash, and allergies, Endocrine: Negative for        

      neck swelling, polydipsia, polyuria, polyphagia, and marked weight changes,                 

      Hematologic/Lymphatic: Negative for swollen nodes, abnormal bleeding, and unusual           

      bruising.                                                                                   

                                                                                                  

Exam:                                                                                             

19:16 Constitutional:  This is a well developed, well nourished patient who is awake, alert,  mh7 

      and in no acute distress. Head/Face:  Normocephalic, atraumatic. Eyes:  Pupils equal        

      round and reactive to light, extra-ocular motions intact.  Lids and lashes normal.          

      Conjunctiva and sclera are non-icteric and not injected.  Cornea within normal limits.      

      Periorbital areas with no swelling, redness, or edema. Neck:  Trachea midline, no           

      thyromegaly or masses palpated, and no cervical lymphadenopathy.  Supple, full range of     

      motion without nuchal rigidity, or vertebral point tenderness.  No Meningismus.             

      Chest/axilla:  Normal chest wall appearance and motion.  Nontender with no deformity.       

      No lesions are appreciated.                                                                 

19:16 Abdomen/GI:  Soft, non-tender, with normal bowel sounds.  No distension or tympany.  No     

      guarding or rebound.  No evidence of tenderness throughout. Back:  No spinal                

      tenderness.  No costovertebral tenderness.  Full range of motion. Skin:  Warm, dry with     

      normal turgor.  Normal color with no rashes, no lesions, and no evidence of cellulitis.     

      MS/ Extremity:  Pulses equal, no cyanosis.  Neurovascular intact.  Full, normal range       

      of motion.                                                                                  

19:16 Cardiovascular: Rate: tachycardic, Rhythm: regular, Pulses: no pulse deficits are           

      appreciated, Heart sounds: normal, normal S1and S2, Edema: is not appreciated, JVD: is      

      not appreciated.                                                                            

19:16 Respiratory: the patient does not display signs of respiratory distress,  Respirations:     

      prolonged exhalation, that is mild, Breath sounds: rhonchi, that are mild, are heard        

      diffusely, Respiratory rate:  20                                                            

19:16 Neuro: Orientation: to person, place, Mentation: confused, Memory: unable to test, the      

      patient has a history of dementia, Cranial nerves: is grossly normal based on the           

      patient's age, Cerebellar function: is grossly normal based on the patient's age,           

      Motor: moves all fours, Sensation: no obvious gross deficits, Gait: not tested. seizure     

      activity, is not displayed by the patient, Abnormal movements: there are no abnormal        

      movements.                                                                                  

                                                                                                  

Vital Signs:                                                                                      

18:43  / 80; Pulse 118; Resp 17; Temp 98.0; Pulse Ox 98% on R/A;                        ll1 

18:52  / 84; Pulse 104; Resp 19; Temp 98.2(TE); Pulse Ox 97% on R/A;                    ap3 

20:00  / 70; Pulse 101; Resp 28; Pulse Ox 90% ;                                         al4 

23:00  / 65; Pulse 89; Resp 25; Pulse Ox 96% on 4 lpm NC;                               al4 

                                                                                             

00:00  / 65; Pulse 96; Resp 24; Pulse Ox 98% on 4 lpm NC;                               al4 

01:00  / 74; Pulse 86; Resp 24; Pulse Ox 100% on 4 lpm NC;                              al4 

01:45  / 84; Pulse 100; Resp 24; Pulse Ox 100% on 4 lpm NC;                             al4 

02:15  / 69; Pulse 87; Resp 24; Pulse Ox 100% on 4 lpm NC;                              al4 

02:34 Weight 52.16 kg (R);                                                                    al4 

                                                                                                  

MDM:                                                                                              

                                                                                             

22:22 Differential Diagnosis: CVA, electrolyte abnormality, alcohol intoxication,             mh7 

      hypoglycemia, intracranial bleed, pneumonia, sepsis, TIA, UTI, volume depletion. Data       

      reviewed: vital signs, nurses notes, EMS record, old medical records, lab test              

      result(s), cardiac enzymes, CBC, electrolytes, EKG, radiologic studies, CT scan, plain      

      films. Data interpreted: Pulse oximetry: on 2L(s) per nasal canula, is 95 %.                

      Interpretation: acceptable. Counseling: I had a detailed discussion with the patient        

      and/or guardian regarding: the historical points, exam findings, and any diagnostic         

      results supporting the discharge/admit diagnosis, the presence of at least one elevated     

      blood pressure reading (>120/80) during this emergency department visit, lab results,       

      radiology results, the need for further work-up and treatment in the hospital. Response     

      to treatment: the patient's symptoms have mildly improved after treatment.                  

22:24 Patient medically screened.                                                             [facility] 

                                                                                                  

                                                                                             

19:13 Order name: Basic Metabolic Panel; Complete Time: 22:17                                 [facility] 

                                                                                             

19:13 Order name: CBC with Diff; Complete Time: 21:51                                                                                                                                      

19:13 Order name: LFT's; Complete Time: 22:17                                                 [facility] 

                                                                                             

19:13 Order name: Magnesium; Complete Time: 22:17                                             [facility] 

                                                                                             

19:13 Order name: NT PRO-BNP; Complete Time: 22:17                                            [facility] 

                                                                                             

19:13 Order name: PT-INR; Complete Time: 21:51                                                [facility] 

                                                                                             

19:13 Order name: Troponin HS; Complete Time: 22:17                                           [facility] 

                                                                                             

19:13 Order name: Blood Culture Adult (2)                                                     [facility] 

                                                                                             

19:13 Order name: Urine Culture                                                               [facility] 

                                                                                             

19:13 Order name: Lactate; Complete Time: 22:11                                               [facility] 

                                                                                             

19:13 Order name: Procalcitonin; Complete Time: 22:11                                         [facility] 

                                                                                             

19:14 Order name: COVID-19/FLU A+B/RSV (Document "Date of Onset" if Symptomatic); Complete    [facility] 

      Time: 22:19                                                                                 

                                                                                             

19:15 Order name: Acetaminophen; Complete Time: 22:17                                         [facility] 

                                                                                             

19:15 Order name: ETOH Level; Complete Time: 21:51                                            [facility] 

                                                                                             

19:13 Order name: XRAY Chest (1 view); Complete Time: 20:31                                   [facility] 

                                                                                             

19:13 Order name: EKG; Complete Time: 19:15                                                   [facility] 

                                                                                             

19:13 Order name: Cardiac monitoring; Complete Time: 21:29                                    [facility] 

                                                                                             

19:13 Order name: EKG - Nurse/Tech; Complete Time: 03:32                                      [facility] 

                                                                                             

19:13 Order name: IV Saline Lock; Complete Time: 21:29                                        [facility] 

                                                                                             

19:13 Order name: CT Head Brain wo Cont; Complete Time: 20:52                                 [facility] 

                                                                                             

19:15 Order name: Salicylate; Complete Time: 21:51                                            [facility] 

                                                                                             

19:26 Order name: Urine Microscopic Only                                                      la[facility] 

                                                                                             

22:34 Order name: CT Chest For PE Angio                                                                                                                                                    

01:11 Order name: Heart Healthy                                                               [facility]

                                                                                             

19:13 Order name: Labs collected and sent; Complete Time: 21:30                               [facility] 

                                                                                             

19:13 Order name: O2 Per Protocol; Complete Time: 21:30                                       [facility] 

                                                                                             

19:13 Order name: O2 Sat Monitoring; Complete Time: 21:30                                     [facility] 

                                                                                                  

Administered Medications:                                                                         

                                                                                             

01:25 Drug: Potassium Effervescent Tablet 50 mEq Route: PO;                                   al4 

02:30 Follow up: Response: No adverse reaction                                                al4 

01:25 Drug: Rocephin (cefTRIAXone) 1 grams Route: IV; Rate: calculated rate; Site: right      al4 

      antecubital;                                                                                

02:30 Follow up: Response: No adverse reaction; IV Status: Completed infusion                 al4 

01:25 Drug: Zithromax (azithromycin) 500 mg Route: IVPB; Infused Over: 1 hrs; Site: right     al4 

      antecubital;                                                                                

02:31 Follow up: Response: No adverse reaction; IV Status: Completed infusion                 al4 

01:33 Drug: Potassium Chloride 20 mEq Route: IV; Rate: calculated rate; Site: right           al4 

      antecubital;                                                                                

03:04 Drug: Lovenox (enoxaparin) 1 mg/kg {Note: Given by Laura Rodriguez RN .} Route: Sub-Q;  al4 

      Site: abdomen;                                                                              

                                                                                                  

                                                                                                  

Disposition Summary:                                                                              

22 22:24                                                                                    

Hospitalization Ordered                                                                           

      Hospitalization Status: Inpatient Admission                                             [facility] 

      Location: Telemetry/MedSurg (Inpatient)                                                 [facility] 

      Condition: Stable                                                                       [facility] 

      Problem: new                                                                            [facility] 

      Symptoms: have improved                                                                 [facility] 

      Bed/Room Type: Standard                                                                 [facility] 

      Provider: Jacobo Cuevas(22 22:35)                                                 [facility] 

      Room Assignment: [facility](22 01:28)                                                      

      Diagnosis                                                                                   

        - Altered mental status, unspecified                                                  [facility] 

        - Hypokalemia                                                                         [facility] 

        - Other pneumonia, unspecified organism                                               [facility] 

      Forms:                                                                                      

        - Medication Reconciliation Form                                                      [facility] 

        - SBAR form                                                                           [facility] 

Signatures:                                                                                       

Dispatcher [employer]                           [facility]                                                 

Kieran Duran FNP-ANDI [id number]                                                  

Nancy Ness, RN                       RN   cg                                                   

Chanell Bonner RN                       RN   ll1                                                  

Jeronimo Houser MD MD   [facility]                                                  

Romeo Ga                            al4                                                  

                                                                                                  

Corrections: (The following items were deleted from the chart)                                    

                                                                                             

21:30 19:15 URINE DRUG SCREEN+CHEM UR.LAB.BRZ ordered. EDMS                                   EDMS

22:35 22:24 Juan Blank mh7                                                               7 

                                                                                             

01:28  22:24 7                                                                           

                                                                                                  

**************************************************************************************************

## 2022-01-22 LAB
BUN BLD-MCNC: 14 MG/DL (ref 7–18)
GLUCOSE SERPLBLD-MCNC: 127 MG/DL (ref 74–106)
HCT VFR BLD CALC: 42.8 % (ref 36–45)
LYMPHOCYTES # SPEC AUTO: 0.5 K/UL (ref 0.7–4.9)
MAGNESIUM SERPL-MCNC: 1.9 MG/DL (ref 1.8–2.4)
PMV BLD: 9.4 FL (ref 7.6–11.3)
POTASSIUM SERPL-SCNC: 3 MMOL/L (ref 3.5–5.1)
RBC # BLD: 5.03 M/UL (ref 3.86–4.86)
TSH SERPL DL<=0.05 MIU/L-ACNC: 2.12 UIU/ML (ref 0.36–3.74)

## 2022-01-22 RX ADMIN — IPRATROPIUM BROMIDE SCH: 0.5 SOLUTION RESPIRATORY (INHALATION) at 02:00

## 2022-01-22 RX ADMIN — IPRATROPIUM BROMIDE SCH MG: 0.5 SOLUTION RESPIRATORY (INHALATION) at 19:55

## 2022-01-22 RX ADMIN — CEFTRIAXONE SCH MLS: 1 INJECTION, POWDER, FOR SOLUTION INTRAMUSCULAR; INTRAVENOUS at 20:41

## 2022-01-22 RX ADMIN — SODIUM CHLORIDE SCH MLS: 0.9 INJECTION, SOLUTION INTRAVENOUS at 08:41

## 2022-01-22 RX ADMIN — IPRATROPIUM BROMIDE SCH MG: 0.5 SOLUTION RESPIRATORY (INHALATION) at 08:15

## 2022-01-22 RX ADMIN — DEXTROSE, SODIUM CHLORIDE, AND POTASSIUM CHLORIDE SCH MLS: 5; .45; .15 INJECTION INTRAVENOUS at 04:34

## 2022-01-22 RX ADMIN — Medication SCH ML: at 20:41

## 2022-01-22 RX ADMIN — IPRATROPIUM BROMIDE SCH MG: 0.5 SOLUTION RESPIRATORY (INHALATION) at 14:50

## 2022-01-22 RX ADMIN — Medication SCH ML: at 08:43

## 2022-01-22 RX ADMIN — LEVOTHYROXINE SODIUM SCH MG: 0.05 TABLET ORAL at 08:43

## 2022-01-22 RX ADMIN — DEXTROSE, SODIUM CHLORIDE, AND POTASSIUM CHLORIDE SCH: 5; .45; .15 INJECTION INTRAVENOUS at 02:00

## 2022-01-22 RX ADMIN — ASPIRIN SCH MG: 81 TABLET, COATED ORAL at 08:42

## 2022-01-22 RX ADMIN — CEFTRIAXONE SCH MLS: 1 INJECTION, POWDER, FOR SOLUTION INTRAMUSCULAR; INTRAVENOUS at 08:43

## 2022-01-22 RX ADMIN — ATORVASTATIN CALCIUM SCH MG: 20 TABLET, FILM COATED ORAL at 08:42

## 2022-01-22 NOTE — P.HP
Certification for Inpatient


Patient admitted to: Inpatient


With expected LOS: >2 Midnights


Practitioner: I am a practitioner with admitting privileges, knowledge of 

patient current condition, hospital course, and medical plan of care.


Services: Services provided to patient in accordance with Admission requirements

found in Title 42 Section 412.3 of the Code of Federal Regulations





Patient History


Date of Service: 01/22/22


Reason for admission: WEAKNESS


History of Present Illness: 





KARIE HAS ALZHEIMER'S DISEASE AND IS NOT ABLE TO COMMUNICATE WELL.  I CALLED 

JOHN, HER DAUGHTER FOR HISTORY BUT SHE IS NOT AVAILABLE.  PER ER DOCTOR HER 

COMPLAINS WERE WEAKNESS AND NOT EATING WELL.  HER BNP WAS HIGH AT 14K BUT SHE 

HAS NO CHF SYMPTOMS.  SHE HAS NO CHEST PAIN.


Allergies





No Known Allergies Allergy (Unverified 06/11/18 12:22)


   





Home medications list reviewed: Yes


Home Medications: 








Aspirin [Lo-Dose Aspirin EC] 81 mg PO DAILY 01/22/22 


Atorvastatin Calcium 20 mg PO DAILY 01/22/22 


Levothyroxine [Synthroid*] 50 mcg PO SEECOM 01/22/22 








- Past Medical/Surgical History


Has patient received pneumonia vaccine in the past: Yes


Diabetic: No


-: Dementia


-: Hypertension


-: Hypothyroidism


-: Hip sx





- Family History


  ** Father


-: Lung disease, Cancer





  ** Mother


-: Hypertension





- Social History


Smoking Status: Former smoker


Alcohol use: Yes


CD- Drugs: No


Caffeine use: Yes


Place of Residence: Home





Review of Systems


10-point ROS is otherwise unremarkable





Physical Examination





- Vital Signs


Temperature: 97.8 F


Blood Pressure: 167/83


Pulse: 88


Respirations: 20


Pulse Ox (%): 92





- Physical Exam


General: Alert, In no apparent distress


HEENT: Atraumatic, PERRLA, Mucous membr. moist/pink, EOMI, Sclerae nonicteric


Neck: Supple, 2+ carotid pulse no bruit, No LAD, Without JVD or thyroid 

abnormality


Respiratory: Clear to auscultation bilaterally, Normal air movement


Cardiovascular: Regular rate/rhythm, Normal S1 S2


Gastrointestinal: Normal bowel sounds, No tenderness


Musculoskeletal: No tenderness


Integumentary: No rashes


Neurological: Normal gait, Normal speech, Normal strength at 5/5 x4 extr, Normal

 tone, Normal affect, Dementia


Lymphatics: No axilla or inguinal lymphadenopathy





- Studies


Laboratory Data (last 24 hrs)





01/21/22 21:15: PT 18.5 H, INR 1.60


01/21/22 21:15: WBC 10.10, Hgb 14.3, Hct 42.5, Plt Count 264


01/21/22 21:15: Sodium 137, Potassium 2.5 L*, BUN 16, Creatinine 0.65, Glucose 

118 H, Magnesium 2.0, Total Bilirubin 0.9, AST 57 H, ALT 17, Alkaline 

Phosphatase 109








Assessment and Plan





- Problems (Diagnosis)


(1) Bacterial pneumonia


Current Visit: Yes   Status: Acute   


Plan: 


IT MAY BE MILD EARLY PNEUMONIOA ON CT SCAN. 


NOT MUCH IMPRESSIVE. 








(2) Hypokalemia


Current Visit: Yes   Status: Acute   


Plan: 


UNCLEAR WHY


LOLLY MAY HAVE HYPERALDOSTERONISM. 


GIVE TRIAL OF SPIRONOLACOTNE. 


STOP IV FLUIDS AS SHE HAS ELEVATED BNP.








(3) Troponin level elevated


Current Visit: Yes   Status: Acute   


Plan: 


ONCE AGAIN THERE ARE NO CARDIAC SYMPTOMS. 


I AM NOT SURE WHY ER DOCTOR ORDERED THIS TEST. 


IT IS HIGH BUT AGAIN SHE HAS DEMENTIA AND NOT A CANDIDATE OF EVALUATION UNLESS 

THERE ARE SYMPTOMS.








- Advance Directives


Does patient have a Living Will: Yes


Does patient have a Durable POA for Healthcare: No

## 2022-01-22 NOTE — RAD REPORT
EXAM DESCRIPTION:  RAD - Foot Left 2 View - 1/22/2022 8:48 pm

 

CLINICAL HISTORY:  Left Foot pain

 

FINDINGS:  No fracture or dislocation

 

Very large calcaneal spur.

 

The bones are osteoporotic.

## 2022-01-22 NOTE — RAD REPORT
EXAM DESCRIPTION:  CT - Chest For Pe Angio - 1/22/2022 4:39 am

 

CLINICAL HISTORY:  76 years, Female, SOB

 

COMPARISON:  None

 

TECHNIQUE:  Multiple transaxial tomograms of the chest were obtained from the lung apices through the
 lung bases utilizing 2 mm slice thickness at 2 mm interval reconstruction after the administration o
f large bolus of IV contrast for complete opacification of the pulmonary arteries.

Subsequent 3-D maximum intensity projection images were generated in the coronal and sagittal plane f
or review.

This exam was performed according to our departmental dose-optimization protocol, which includes auto
mated exposure control, adjustment of the mA and/or kV according to patient size and/or use of iterat
shashank reconstruction technique.

 

FINDINGS:  Images are compromised by motion artifact

The lungs parenchyma demonstrate interlobular septal thickening minimal groundglass density right and
 left upper lobe/inferior lingular segment and posterior segment lower lobes with at this correspond 
to interstitial lung disease and/or atypical manifestation of viral pneumonia/Covid 19 pneumonia coul
d be of consideration. There is no significant masses/or consolidations.   The trachea mainstem bronc
hus demonstrate to be normal. There is no significant pericardial or pleural effusions.

The thoracic aorta demonstrate intimal calcification at the aortic arch and descending portion. There
 is no evidence for significant dissection and/or aneurysm. The heart is increased in size. There are
 coronary artery calcifications. No evidence for right ventricular strain.

There is a prominent right hilar lymph node measuring 15 mm on image 56. There is no significant medi
astinal lymphadenopathy. The axillary regions demonstrate to be clear.

Pulmonary arteries demonstrate there is a small partial filling defect within the subsegmental branch
 of the proximal superior lingular segment on axial image 57-60. There is no evidence for right ventr
icular strain.   The bone windows demonstrate diffuse bony osteopenia. No significant compression def
ormities.

The visualized portions of the upper abdomen demonstrate to be grossly unremarkable.

 

IMPRESSION:  Compromise images due to motion artifact.

Small partial filling defect within the subsegmental branch of the proximal superior lingular segment
 on axial image 57-60.

Findings could be seen on COVID-19 pneumonia. Other processes such as influenza pneumonia and organiz
ing pneumonia, as can be seen with drug toxicity and connective tissue disease, can cause a similar i
maging pattern.

Cardiomegaly with coronary artery calcifications.

Prominent right hilar lymph node measuring 15 mm perhaps reactive in nature.

Diffuse bony osteopenia.

 

Electronically signed by:   Charli Dickey MD   1/21/2022 11:28 PM CST Workstation: [id number]

 

 

 

Due to temporary technical issues with the PACS/Fluency reporting system, reports are being signed by
 the in house radiologists without review as a courtesy to insure prompt reporting. The interpreting 
radiologist is fully responsible for the content of the report.

## 2022-01-23 LAB
ALBUMIN SERPL BCP-MCNC: 2.2 G/DL (ref 3.4–5)
ALP SERPL-CCNC: 105 U/L (ref 45–117)
ALT SERPL W P-5'-P-CCNC: 11 U/L (ref 12–78)
AST SERPL W P-5'-P-CCNC: 40 U/L (ref 15–37)
BUN BLD-MCNC: 14 MG/DL (ref 7–18)
CKMB CREATINE KINASE MB: 2.2 NG/ML (ref 1–3.6)
GLUCOSE SERPLBLD-MCNC: 102 MG/DL (ref 74–106)
HCT VFR BLD CALC: 40.1 % (ref 36–45)
LYMPHOCYTES # SPEC AUTO: 0.9 K/UL (ref 0.7–4.9)
MAGNESIUM SERPL-MCNC: 2 MG/DL (ref 1.8–2.4)
PMV BLD: 9.3 FL (ref 7.6–11.3)
POTASSIUM SERPL-SCNC: 3.5 MMOL/L (ref 3.5–5.1)
RBC # BLD: 4.72 M/UL (ref 3.86–4.86)

## 2022-01-23 RX ADMIN — SPIRONOLACTONE SCH MG: 25 TABLET, FILM COATED ORAL at 08:30

## 2022-01-23 RX ADMIN — CEFTRIAXONE SCH MLS: 1 INJECTION, POWDER, FOR SOLUTION INTRAMUSCULAR; INTRAVENOUS at 08:28

## 2022-01-23 RX ADMIN — IPRATROPIUM BROMIDE SCH MG: 0.5 SOLUTION RESPIRATORY (INHALATION) at 13:49

## 2022-01-23 RX ADMIN — METHYLPREDNISOLONE SODIUM SUCCINATE SCH MG: 125 INJECTION, POWDER, FOR SOLUTION INTRAMUSCULAR; INTRAVENOUS at 11:54

## 2022-01-23 RX ADMIN — ASPIRIN SCH MG: 81 TABLET, COATED ORAL at 08:30

## 2022-01-23 RX ADMIN — IPRATROPIUM BROMIDE SCH MG: 0.5 SOLUTION RESPIRATORY (INHALATION) at 20:20

## 2022-01-23 RX ADMIN — ATORVASTATIN CALCIUM SCH MG: 20 TABLET, FILM COATED ORAL at 08:29

## 2022-01-23 RX ADMIN — ENOXAPARIN SODIUM SCH MG: 60 INJECTION SUBCUTANEOUS at 21:09

## 2022-01-23 RX ADMIN — SODIUM CHLORIDE SCH MLS: 0.9 INJECTION, SOLUTION INTRAVENOUS at 09:15

## 2022-01-23 RX ADMIN — Medication SCH ML: at 08:30

## 2022-01-23 RX ADMIN — Medication SCH ML: at 21:00

## 2022-01-23 RX ADMIN — IPRATROPIUM BROMIDE SCH MG: 0.5 SOLUTION RESPIRATORY (INHALATION) at 01:50

## 2022-01-23 RX ADMIN — METHYLPREDNISOLONE SODIUM SUCCINATE SCH MG: 125 INJECTION, POWDER, FOR SOLUTION INTRAMUSCULAR; INTRAVENOUS at 21:09

## 2022-01-23 RX ADMIN — IPRATROPIUM BROMIDE SCH MG: 0.5 SOLUTION RESPIRATORY (INHALATION) at 07:50

## 2022-01-23 NOTE — RAD REPORT
EXAM DESCRIPTION:  US - Extrem Venous W Compress Sammy - 1/23/2022 6:40 pm

 

CLINICAL HISTORY:  Rule out DVT,

 

COMPARISON:  None.

 

TECHNIQUE:  Real-time sonographic evaluation of the bilateral lower extremity deep venous systems was
 performed.

 

FINDINGS:  Normal compressibility, flow augmentation, phasic flow and spontaneous flow is identified 
in both the left and right lower extremity deep venous systems. No intraluminal filling defects seen.


 

IMPRESSION:  No DVT in either lower extremity.

## 2022-01-23 NOTE — P.PN
Date of Service: 01/23/22





Subjective:


Patient alert, oriented x2, dementia


Son at bedside, suspicion is at baseline mentally


She denies any symptoms, denies any shortness of breath, on 3 L nasal cannula











ROS: 


10 point ROS as noted above, otherwise negative








Physical exam


GEN: Alert, orientedx2


HEENT: Normal conjunctiva, sclera anicteric


CV: Regular rate and rhythm, no edema


Pulm: Non-labored respiration on 3L NC


ABD: Soft, nontender, nondistended


Integumentary: No rashes


Neuro: Normal speech, normal affect, +dementia








Problem List


Dizziness


Bacterial pneumonia


Possible PE


Hypokalemia


Elevated troponin


Dementia


Left heel pain/tenderness





Possible mild, early pneumonia on CT scan, not very impressive


CT scan noted subsegmental filling defects.  Discussed with radiology, despite 

motion artifacts, this is more convincing for a true filling defect


For now, will increase to therapeutic dose of Lovenox


Pulmonology consulted, patient with new oxygen requirement


Echocardiogram ordered


Patient with hypokalemia, thought to maybe have some hyperaldosteronism, 

continue spironolactone


Troponin mildly elevated, denies any chest pain/cardiac symptoms


Physical therapy consulted


Unclear etiology of dizziness, possible hypoxia, possible arrhythmia.  Continue 

telemetry


Patient reported left heel pain/tenderness yesterday, x-ray was obtained which 

revealed calcaneal spur, and orthopedic surgery was consulted per Dr. Cuevas





VTE: Therapeutic Lovenox


Code: Full


Dispo: Anticipate SNF, in approximately 2 days


Son at bedside, states patient has been not eating as well, more weak/tired over

the last 3 weeks.  Over the last 3 days has been feeling very dizzy when 

attempting to get up out of her recliner.





Time Spent Managing Pts Care (In Minutes): 35

## 2022-01-23 NOTE — P.CNS
Date of Consult: 01/23/22


Reason for Consult: Pneumonia


Chief Complaint: WEAKNESS


History of Present Illness: 





Patient is 76 years of age her son is present at the bedside patient is very 

alert oriented responsive cooperative according to her son she suddenly became 

weak unable to get up and it appeared in the hospital normally she is able to 

walk to the bathroom patient denies any complaints apart from feeling weak


Allergies





No Known Allergies Allergy (Unverified 06/11/18 12:22)


   





Home Medications: 








Aspirin [Lo-Dose Aspirin EC] 81 mg PO DAILY 01/22/22 


Atorvastatin Calcium 20 mg PO DAILY 01/22/22 


Levothyroxine [Synthroid*] 50 mcg PO SEECOM 01/22/22 








- Past Medical/Surgical History


Diabetic: No


-: Dementia


-: Hypertension


-: Hypothyroidism


-: Hip sx





- Family History


  ** Father


Medical History: Lung disease, Cancer





  ** Mother


Medical History: Hypertension





- Social History


Alcohol use: Yes


CD- Drugs: No


Caffeine use: Yes


Place of Residence: Home





Review of Systems


10-point ROS is otherwise unremarkable


General: Weakness





Physical Examination














Temp Pulse Resp BP Pulse Ox


 


 97.1 F   82   16   129/66   97 


 


 01/23/22 04:00  01/23/22 08:30  01/23/22 04:00  01/23/22 08:30  01/23/22 04:00








General: Alert, Oriented x3, Cooperative


Respiratory: Crackles/rales


Cardiovascular: No edema, Regular rate/rhythm


Gastrointestinal: Normal bowel sounds, Soft and benign


Musculoskeletal: No clubbing, No swelling


Neurological: Normal speech





- Problems


(1) Bacterial pneumonia


Current Visit: Yes   Status: Acute   


Plan: 


Patient is 76 years of age admitted with sudden onset of weakness chest x-ray 

shows bilateral interstitial changes patient has tested COVID-negative recommend

 trial of steroids levofloxacin for atypical coverage questionable filling 

defect doubt pulmonary embolism can change to DVT prophylactic dose

## 2022-01-24 LAB
BLD SMEAR INTERP: (no result)
BUN BLD-MCNC: 14 MG/DL (ref 7–18)
CKMB CREATINE KINASE MB: 3.2 NG/ML (ref 1–3.6)
GIANT PLATELETS BLD QL SMEAR: PRESENT
GLUCOSE SERPLBLD-MCNC: 146 MG/DL (ref 74–106)
HCT VFR BLD CALC: 42.5 % (ref 36–45)
LYMPHOCYTES # SPEC AUTO: 0.3 K/UL (ref 0.7–4.9)
MORPHOLOGY BLD-IMP: (no result)
PMV BLD: 9.7 FL (ref 7.6–11.3)
POTASSIUM SERPL-SCNC: 3.8 MMOL/L (ref 3.5–5.1)
RBC # BLD: 4.99 M/UL (ref 3.86–4.86)

## 2022-01-24 RX ADMIN — ENOXAPARIN SODIUM SCH MG: 60 INJECTION SUBCUTANEOUS at 08:25

## 2022-01-24 RX ADMIN — SPIRONOLACTONE SCH MG: 25 TABLET, FILM COATED ORAL at 08:23

## 2022-01-24 RX ADMIN — Medication SCH PKT: at 21:21

## 2022-01-24 RX ADMIN — ASPIRIN SCH MG: 81 TABLET, COATED ORAL at 08:24

## 2022-01-24 RX ADMIN — Medication SCH: at 21:21

## 2022-01-24 RX ADMIN — ENOXAPARIN SODIUM SCH MG: 60 INJECTION SUBCUTANEOUS at 21:20

## 2022-01-24 RX ADMIN — IPRATROPIUM BROMIDE SCH MG: 0.5 SOLUTION RESPIRATORY (INHALATION) at 01:05

## 2022-01-24 RX ADMIN — ATORVASTATIN CALCIUM SCH MG: 20 TABLET, FILM COATED ORAL at 08:24

## 2022-01-24 RX ADMIN — IPRATROPIUM BROMIDE SCH MG: 0.5 SOLUTION RESPIRATORY (INHALATION) at 21:40

## 2022-01-24 RX ADMIN — IPRATROPIUM BROMIDE SCH MG: 0.5 SOLUTION RESPIRATORY (INHALATION) at 14:30

## 2022-01-24 RX ADMIN — Medication SCH ML: at 21:23

## 2022-01-24 RX ADMIN — LEVOTHYROXINE SODIUM SCH MG: 0.05 TABLET ORAL at 08:24

## 2022-01-24 RX ADMIN — IPRATROPIUM BROMIDE SCH MG: 0.5 SOLUTION RESPIRATORY (INHALATION) at 08:10

## 2022-01-24 RX ADMIN — Medication SCH ML: at 08:25

## 2022-01-24 RX ADMIN — METHYLPREDNISOLONE SODIUM SUCCINATE SCH MG: 125 INJECTION, POWDER, FOR SOLUTION INTRAMUSCULAR; INTRAVENOUS at 08:24

## 2022-01-24 NOTE — EKG
Test Date:    2022-01-22               Test Time:    03:23:33

Technician:   AL                                     

                                                     

MEASUREMENT RESULTS:                                       

Intervals:                                           

Rate:         78                                     

OH:           134                                    

QRSD:         90                                     

QT:           454                                    

QTc:          517                                    

Axis:                                                

P:            47                                     

OH:           134                                    

QRS:          -11                                    

T:            -31                                    

                                                     

INTERPRETIVE STATEMENTS:                                       

                                                     

Normal sinus rhythm

T wave abnormality, consider inferior ischemia

Prolonged QT

Abnormal ECG

Compared to ECG 05/12/2004 09:57:00

Possible ischemia now present

Left ventricular hypertrophy no longer present

T-wave abnormality still present



Electronically Signed On 01-24-22 08:03:18 CST by Brandt Allen

## 2022-01-24 NOTE — RAD REPORT
EXAM DESCRIPTION:  RAD - Chest Single View - 1/24/2022 6:13 am

 

CLINICAL HISTORY:  hypoxia, f/u opacities

 

COMPARISON:  Chest Single View dated 1/21/2022

 

FINDINGS:  Lines: None.

Lungs: Similar interstitial airspace disease bilaterally

Pleural: No significant pleural effusions or pneumothorax.

Cardiac: Cardiomegaly.

Bones: No acute fractures.

Other:

 

IMPRESSION:  No significant change in aeration of the lungs. Bilateral interstitial airspace disease 
which may represent a combination of acute pneumonia on chronic mixed obstructive/restrictive lung di
sease.

## 2022-01-24 NOTE — P.PN
Subjective


Date of Service: 01/24/22


Chief Complaint: Interstitial pneumonia


Subjective: Improving (Patient is doing well did ambulate today been having 

progressive weakness for the past 3 months son at the bedside much better)





Review of Systems


General: Weakness


Respiratory: Shortness of Breath





Physical Examination





- Vital Signs


Temperature: 98.2 F


Blood Pressure: 141/76


Pulse: 91


Respirations: 18


Pulse Ox (%): 93





- Physical Exam


General: Alert, Oriented x3


Neck: Supple


Respiratory: Crackles/rales


Cardiovascular: No edema (Bilateral crackles), Regular rate/rhythm





Assessment & Plan





- Problems (Diagnosis)


(1) Bacterial pneumonia


Current Visit: Yes   Status: Acute   


Plan: 


Patient has bilateral interstitial pneumonia is doing much better continue with 

prednisone milligrams twice a day for week then 10 twice a day continue with 

antibiotics patient has been evaluated for long-term care change to p.o. 

prednisone evaluate for home O2 patient satting satisfactory on 3 L room air sat

is 81% probably qualify for home O2 follow-up with my clinic in 2 weeks quite 

possible that she has underlying pulmonary fibrosis will need to be further 

evaluated as an outpatient

## 2022-01-24 NOTE — P.PN
Date of Service: 01/24/22





Subjective:


Feeling better, breathing slightly more comfortably, still requiring oxygen


Ambulated 20-30 feet, without dizziness


No new concerns/symptoms





ROS: 


10 point ROS as noted above, otherwise negative








Physical exam


GEN: Alert, orientedx2


HEENT: Normal conjunctiva, sclera anicteric


CV: Regular rate and rhythm, no edema


Pulm: Non-labored respiration on 3L NC


ABD: Soft, nontender, nondistended


Integumentary: No rashes


Neuro: Normal speech, normal affect, +dementia








Problem List


Dizziness, resolved


Bacterial pneumonia


Possible PE


Hypokalemia


Elevated troponin


Dementia


Left heel pain/tenderness





Dizziness resolved, ambulated 20-30 feet today


Possible mild, early pneumonia on CT scan, not very impressive


CT scan noted subsegmental filling defects.  Discussed with radiology, despite 

motion artifacts, this is more convincing for a true filling defect/PE


Increase to therapeutic dose of Lovenox on 1/23


Patient son does state that she falls at least once a month, and the size of PE 

is unlikely to be causing her symptoms, may be slightly contributing.


Pulmonology consulted, recommends switching to low-dose aspirin


Echocardiogram ordered


Patient with hypokalemia, thought to maybe have some hyperaldosteronism, 

continue spironolactone


Troponin mildly elevated, denies any chest pain/cardiac symptoms


Physical therapy consulted


Unclear etiology of dizziness, possible hypoxia, possible arrhythmia.  Continue 

telemetry


Patient reported left heel pain/tenderness yesterday, x-ray was obtained which 

revealed calcaneal spur, and orthopedic surgery was consulted per Dr. Cuevas





VTE: Therapeutic Lovenox


Code: Full


Dispo:  consulted for SNF


Son at bedside, states patient has been not eating as well, more weak/tired over

the last 3 weeks.  Over the last 3 days has been feeling very dizzy when 

attempting to get up out of her recliner.





Time Spent Managing Pts Care (In Minutes): 35

## 2022-01-25 VITALS — OXYGEN SATURATION: 95 %

## 2022-01-25 LAB
CKMB CREATINE KINASE MB: 4.6 NG/ML (ref 1–3.6)
PMV BLD: 9.6 FL (ref 7.6–11.3)

## 2022-01-25 RX ADMIN — IPRATROPIUM BROMIDE SCH MG: 0.5 SOLUTION RESPIRATORY (INHALATION) at 13:34

## 2022-01-25 RX ADMIN — ENOXAPARIN SODIUM SCH MG: 60 INJECTION SUBCUTANEOUS at 08:55

## 2022-01-25 RX ADMIN — SPIRONOLACTONE SCH MG: 25 TABLET, FILM COATED ORAL at 08:56

## 2022-01-25 RX ADMIN — Medication SCH ML: at 08:57

## 2022-01-25 RX ADMIN — Medication SCH ML: at 21:03

## 2022-01-25 RX ADMIN — IPRATROPIUM BROMIDE SCH MG: 0.5 SOLUTION RESPIRATORY (INHALATION) at 20:10

## 2022-01-25 RX ADMIN — IPRATROPIUM BROMIDE SCH MG: 0.5 SOLUTION RESPIRATORY (INHALATION) at 08:44

## 2022-01-25 RX ADMIN — Medication SCH PKT: at 21:02

## 2022-01-25 RX ADMIN — Medication SCH PKT: at 08:57

## 2022-01-25 RX ADMIN — ATORVASTATIN CALCIUM SCH MG: 20 TABLET, FILM COATED ORAL at 08:56

## 2022-01-25 RX ADMIN — IPRATROPIUM BROMIDE SCH MG: 0.5 SOLUTION RESPIRATORY (INHALATION) at 02:05

## 2022-01-25 RX ADMIN — ASPIRIN SCH MG: 81 TABLET, COATED ORAL at 08:56

## 2022-01-25 RX ADMIN — ENOXAPARIN SODIUM SCH MG: 60 INJECTION SUBCUTANEOUS at 21:00

## 2022-01-25 RX ADMIN — Medication SCH ML: at 21:01

## 2022-01-25 NOTE — P.PN
Subjective


Date of Service: 01/25/22


Chief Complaint: Interstitial pneumonia


Patient ambulated about 100 feet with a rolling walker.


Her oxygen saturation is 100% on 2 L oxygen by nasal cannula.











Physical Examination





- Vital Signs


Temperature: 97.4 F


Blood Pressure: 143/66


Pulse: 108


Respirations: 22


Pulse Ox (%): 98





- Physical Exam


General: Alert, In no apparent distress





Assessment And Plan





- Plan


Physical exam


GEN: Alert, orientedx2


HEENT: Normal conjunctiva, sclera anicteric


CV: Regular rate and rhythm, no edema


Pulm: Non-labored respiration on 2L NC, mild basilar rales


ABD: Soft, nontender, nondistended


Integumentary: No rashes


Neuro: Normal speech, normal affect, +dementia








Problem List


Dizziness, resolved


Bacterial pneumonia


Possible PE


Hypokalemia


Elevated troponin


Dementia


Left heel pain/tenderness





Dizziness resolved, patient now ambulating several feet, up to 100 ft with a 

walker.


CT scan noted subsegmental filling defects.  Discussed with radiology, despite 

motion artifacts, this is more convincing for a true filling defect/PE


Patient started on therapeutic dose of Lovenox but his son is concerned about 

her multiple falls and anticoagulation.


Patient son does state that she falls at least once a month, and the size of PE 

is unlikely to be causing her symptoms, may be slightly contributing.


Pulmonology-Dr. Nolasco recommends switching to low-dose aspirin for the PE.


Echocardiogram completed. The result is pending.


Continue spironolactone


Troponin mildly elevated, denies any chest pain/cardiac symptoms


Continue PT.


Unclear etiology of dizziness. No arrhythmia identified.


Patient reported left heel pain/tenderness yesterday, x-ray was obtained which 

revealed calcaneal spur.





VTE: Therapeutic Lovenox


Code: Full


Dispo: Family are considering home with home health rather than SNF.

## 2022-01-26 VITALS — DIASTOLIC BLOOD PRESSURE: 67 MMHG | SYSTOLIC BLOOD PRESSURE: 110 MMHG | TEMPERATURE: 97.5 F

## 2022-01-26 LAB
BUN BLD-MCNC: 19 MG/DL (ref 7–18)
GLUCOSE SERPLBLD-MCNC: 145 MG/DL (ref 74–106)
POTASSIUM SERPL-SCNC: 3.7 MMOL/L (ref 3.5–5.1)

## 2022-01-26 RX ADMIN — ASPIRIN SCH MG: 81 TABLET, COATED ORAL at 09:26

## 2022-01-26 RX ADMIN — Medication SCH ML: at 09:00

## 2022-01-26 RX ADMIN — IPRATROPIUM BROMIDE SCH: 0.5 SOLUTION RESPIRATORY (INHALATION) at 08:00

## 2022-01-26 RX ADMIN — SPIRONOLACTONE SCH MG: 25 TABLET, FILM COATED ORAL at 09:27

## 2022-01-26 RX ADMIN — ATORVASTATIN CALCIUM SCH MG: 20 TABLET, FILM COATED ORAL at 09:26

## 2022-01-26 RX ADMIN — LEVOTHYROXINE SODIUM SCH MG: 0.05 TABLET ORAL at 09:27

## 2022-01-26 RX ADMIN — Medication SCH PKT: at 09:00

## 2022-01-26 RX ADMIN — IPRATROPIUM BROMIDE SCH MG: 0.5 SOLUTION RESPIRATORY (INHALATION) at 01:10

## 2022-01-26 NOTE — ECHO
HEIGHT: 5 ft 5 in   WEIGHT: 115 lb 0 oz   DATE OF STUDY: 01/25/2022   REFER DR: Jacobo Cuevas MD

2-DIMENSIONAL: YES

     M.MODE: YES

 DOPPLER: YES

COLOR FLOW: YES



                    TDS:  

PORTABLE: 

 DEFINITY:  

BUBBLE STUDY: 





DIAGNOSIS:  BNP ELEVATION



CARDIAC HISTORY:  

CATHERIZATION: 

SURGERY: 

PROSTHETIC VALVE: 

PACEMAKER: 





MEASUREMENTS (cm)

    DIASTOLIC (NORMALS)                 SYSTOLIC (NORMALS)

IVSd                 1.0 (0.6-1.2)                    LA Diam 3.2 (1.9-4.0)     LVEF       
  30-35%  

LVIDd               4.2 (3.5-5.7)                        LVIDs      3.5 (2.0-3.5)     %FS  
        17%

LVPWd             1.0 (0.6-1.2)

Ao Diam           2.8 (2.0-3.7)



2 DIMENSIONAL ASSESSMENT:

RIGHT ATRIUM:                   NORMAL

LEFT ATRIUM:       NORMAL



RIGHT VENTRICLE:            NORMAL

LEFT VENTRICLE: NORMAL



TRICUSPID VALVE:             NORMAL

MITRAL VALVE:     NORMAL



PULMONIC VALVE:             NORMAL

AORTIC VALVE:     NORMAL



PERICARDIAL EFFUSION: NONE

AORTIC ROOT:      NORMAL





LEFT VENTRICULAR WALL MOTION:     SEVERE GLOBAL HYPOKINESIS



DOPPLER/COLOR FLOW:     MODERATE TRICUSPID REGURGITATION.  MODERATE PULMONARY 
HYPERTENSION.



COMMENTS:      MODERATE PULMONARY HYPERTENSION.  MODERATE TRICUSPID REGURGITATION.  
MODERATE TRICUSPID REGURGITATION.  EJECTION FRACTION 30-35%.  SEVERE GLOBAL HYPOKINESIS.  
ELEVATED ECHO GENICITY - CARDIAC AMYLOIDOSIS.



TECHNOLOGIST:   SADI LEMUS

## 2022-01-26 NOTE — P.DS
Admission Date: 01/22/22


Discharge Date: 01/26/22


Disposition: ROUTINE DISCHARGE


Discharge Condition: FAIR


Reason for Admission: Interstitial pneumonia





- Problems


(1) Bacterial pneumonia


Current Visit: Yes   Status: Acute   





(2) Hypokalemia


Current Visit: Yes   Status: Acute   





(3) Troponin level elevated


Current Visit: Yes   Status: Acute   


Brief History of Present Illness: 





KARIE HAS ALZHEIMER'S DISEASE AND IS NOT ABLE TO COMMUNICATE WELL.  I CALLED 

JOHN, HER DAUGHTER FOR HISTORY BUT SHE IS NOT AVAILABLE.  PER ER DOCTOR HER 

COMPLAINS WERE WEAKNESS AND NOT EATING WELL.  HER BNP WAS HIGH AT 14K BUT SHE 

HAS NO CHF SYMPTOMS.  SHE HAS NO CHEST PAIN.


Hospital Course: 


MS GUZMAN COMES WITH WEAKNESS AS SYMPTOMS.  NOT MUCH WAS DETECTED ON LUNG CT 

SCAN.  THERE MAY BE AN EARLY PNEUMONIA.  ER DOCTOR DID BNP AND IT WAS HIGH BUT 

THERE IS NO SIGN OF CHF.  I DID ECHO AND IT SHOWED ECHOGENICITY LIKE WE SEE IN 

AMYLOIDOSIS, MOD PULMONARY HTN AND LOW EF OF 30%.  SHE IS SYMPTOMFREE.  I ADDED 

CARVEDILOL TO SPIRONOLACTONE SHE WAS STARTED BY ME A FEW DAYS AGO.  SHE WAS SEEN

BY HOSPITAL DOCTORS UNTIL TODAY AS I AM BACK.  SHE IS STABLE AND AMBULATING TO 

GO HOME WITH FAMILY. SHE HAS MILD HYPOXIA AND WILL BE ON OXGYEN.  I CALLED SON 

TO DISCUSS BUT HE DID NOT  PHONE AND VOICEMAIL IS FULL. 


Vital Signs/Physical Exam: 














Temp Pulse Resp BP Pulse Ox


 


 97.0 F   80   20   109/58 L  98 


 


 01/26/22 08:00  01/26/22 09:27  01/26/22 08:00  01/26/22 08:00  01/26/22 08:00








Laboratory Data at Discharge: 














WBC  7.10 K/uL (4.3-10.9)   01/24/22  05:25    


 


Hgb  13.9 g/dL (12.0-15.0)   01/24/22  05:25    


 


Hct  42.5 % (36.0-45.0)   01/24/22  05:25    


 


Plt Count  333 K/uL (152-406)   01/25/22  07:41    


 


PT  18.5 SECONDS (9.5-12.5)  H  01/21/22  21:15    


 


INR  1.60   01/21/22  21:15    


 


Sodium  139 mmol/L (136-145)   01/26/22  04:44    


 


Potassium  3.7 mmol/L (3.5-5.1)   01/26/22  04:44    


 


BUN  19 mg/dL (7-18)  H  01/26/22  04:44    


 


Creatinine  0.51 mg/dL (0.55-1.3)  L  01/26/22  04:44    


 


Glucose  145 mg/dL ()  H  01/26/22  04:44    


 


Magnesium  2.0 mg/dL (1.8-2.4)   01/23/22  04:11    


 


Total Bilirubin  0.5 mg/dL (0.2-1.0)   01/23/22  04:11    


 


AST  40 U/L (15-37)  H  01/23/22  04:11    


 


ALT  11 U/L (12-78)  L  01/23/22  04:11    


 


Alkaline Phosphatase  105 U/L ()   01/23/22  04:11    








Home Medications: 








Aspirin [Lo-Dose Aspirin EC] 81 mg PO DAILY 01/22/22 


Atorvastatin Calcium 20 mg PO DAILY 01/22/22 


Levothyroxine [Synthroid*] 50 mcg PO SEECOM 01/22/22 


Albuterol Neb [Proventil 0.083% Neb Soln] 2.5 mg NEB Q6HP PRN #120 amp 01/25/22 


Ensure Enlive 237 ml PO BID #30 can 01/25/22 


Ipratropium Neb [Atrovent*] 0.5 mg NEB [id number] #120 amp 01/25/22 


Eliud [Eliud*] 1 pkt PO BID #30 powd.pack 01/25/22 


Nebulizer [Aeroneb Go Nebulizer] 1 each MC TID #1 each 01/25/22 


Spironolactone [Aldactone*] 25 mg PO DAILY #30 tab 01/25/22 


levoFLOXacin [Levaquin*] 750 mg PO DAILY #5 tab 01/25/22 


predniSONE [Prednisone*] 20 mg PO BID #10 tab 01/25/22 


carvediloL [Coreg*] 6.25 mg PO BID #60 tab 01/26/22 





New Medications: 


Nebulizer [Aeroneb Go Nebulizer] 1 each MC TID #1 each


Spironolactone [Aldactone*] 25 mg PO DAILY #30 tab


Ipratropium Neb [Atrovent*] 0.5 mg NEB [id number] #120 amp


carvediloL [Coreg*] 6.25 mg PO BID #60 tab


Ensure Enlive 237 ml PO BID #30 can


Eliud [Eliud*] 1 pkt PO BID #30 powd.pack


levoFLOXacin [Levaquin*] 750 mg PO DAILY #5 tab


predniSONE [Prednisone*] 20 mg PO BID #10 tab


Albuterol Neb [Proventil 0.083% Neb Soln] 2.5 mg NEB Q6HP PRN #120 amp


 PRN Reason: Shortness Of Breath


Diet: AHA


Activity: Fall precautions


Followup: 


Jacobo Cuevas MD [Primary Care Provider] - 1-2 Weeks

## 2022-02-01 ENCOUNTER — HOSPITAL ENCOUNTER (EMERGENCY)
Dept: HOSPITAL 97 - ER | Age: 77
Discharge: HOME | End: 2022-02-01
Payer: COMMERCIAL

## 2022-02-01 VITALS — DIASTOLIC BLOOD PRESSURE: 65 MMHG | OXYGEN SATURATION: 100 % | SYSTOLIC BLOOD PRESSURE: 125 MMHG

## 2022-02-01 VITALS — TEMPERATURE: 98.3 F

## 2022-02-01 DIAGNOSIS — E03.9: ICD-10-CM

## 2022-02-01 DIAGNOSIS — I10: ICD-10-CM

## 2022-02-01 DIAGNOSIS — K59.00: Primary | ICD-10-CM

## 2022-02-01 DIAGNOSIS — F03.90: ICD-10-CM

## 2022-02-01 DIAGNOSIS — M79.605: ICD-10-CM

## 2022-02-01 DIAGNOSIS — Z79.82: ICD-10-CM

## 2022-02-01 PROCEDURE — 74018 RADEX ABDOMEN 1 VIEW: CPT

## 2022-02-01 PROCEDURE — 99284 EMERGENCY DEPT VISIT MOD MDM: CPT

## 2022-02-01 PROCEDURE — 93970 EXTREMITY STUDY: CPT

## 2022-02-01 NOTE — RAD REPORT
EXAM DESCRIPTION:  US - Extrem Venous W Compress Sammy - 2/1/2022 1:38 am

 

CLINICAL HISTORY:  Pain

 

COMPARISON:  None.

 

TECHNIQUE:  Real-time sonographic evaluation of the bilateral lower extremity deep venous systems was
 performed.

 

FINDINGS:  Normal compressibility, flow augmentation, phasic flow and spontaneous flow is identified 
in both the left and right lower extremity deep venous systems. No intraluminal filling defects seen.


 

IMPRESSION:  No DVT in either lower extremity.

## 2022-02-01 NOTE — RAD REPORT
EXAM DESCRIPTION:  RAD - Abdomen 1 View (KUB) - 2/1/2022 1:45 am

 

CLINICAL HISTORY:  CONSTIPATION

 

COMPARISON:  No comparisons

 

FINDINGS:  Nonobstructive bowel gas pattern. Degenerative changes are present in the spine and at the
 hips. Mild formed stool burden. Chronic interstitial lung disease. Cardiomegaly.

 

IMPRESSION:  Nonobstructive bowel gas pattern.

## 2022-02-01 NOTE — ER
Nurse's Notes                                                                                     

 [facility]                                                                 

Name: Re Jeong                                                                                

Age: 76 yrs                                                                                       

Sex: Female                                                                                       

: 1945                                                                                   

MRN: [id number]                                                                                   

Arrival Date: 2022                                                                          

Time: 00:20                                                                                       

Account#: [id number]                                                                            

Bed 15                                                                                            

Private MD:                                                                                       

Diagnosis: Constipation, unspecified                                                              

                                                                                                  

Presentation:                                                                                     

                                                                                             

00:36 Chief complaint: EMS states: Family told EMS patient with pain to left lower extremity. tk1 

      Concerned with possible blood clot. Coronavirus screen: Vaccine status: Patient reports     

      receiving the 2nd dose of the covid vaccine. Date 2022 Patient reports         

      receiving the 1st dose of the Covid vaccine. Date 2022 Client denies travel     

      out of the [place] in the last 14 days. At this time, the client does not indicate any         

      symptoms associated with coronavirus-19. Ebola Screen: Patient negative for fever           

      greater than or equal to 101.5 degrees Fahrenheit, and additional compatible Ebola          

      Virus Disease symptoms Patient denies travel to an Ebola-affected area in the 21 days       

      before illness onset. Initial Sepsis Screen: Does the patient meet any 2 criteria? No.      

      Patient's initial sepsis screen is negative. Does the patient have a suspected source       

      of infection? No. Patient's initial sepsis screen is negative. Risk Assessment: Do you      

      want to hurt yourself or someone else? Patient reports no desire to harm self or            

      others. Onset of symptoms was 2022 at 21:00.                                    

00:36 Method Of Arrival: EMS                                                                  tk1 

00:36 Acuity: YADIRA 4                                                                           tk1 

                                                                                                  

Triage Assessment:                                                                                

00:42 General: Appears in no apparent distress. comfortable, well groomed, well developed,    tk1 

      well nourished, Behavior is calm, cooperative. Pain: Denies pain. EENT: No deficits         

      noted. Neuro: Level of Consciousness is awake, alert, obeys commands, Oriented to           

      person, place,  are equal bilaterally Moves all extremities. Gait is steady,           

      Speech is normal, Facial symmetry appears normal, Reports. Cardiovascular: No deficits      

      noted. Heart tones S1 S2 present Capillary refill < 3 seconds is brisk Clubbing of nail     

      beds is absent JVD is absent Patient's skin is warm and dry. Rhythm is sinus rhythm.        

      Respiratory: No deficits noted. Airway is patent Breath sounds are clear bilaterally.       

      GI: Parent/caregiver reports the patient having constipation, No BM in 5 days. : No       

      deficits noted. Derm: No deficits noted. Musculoskeletal: No deficits noted.                

                                                                                                  

Historical:                                                                                       

- Home Meds:                                                                                      

00:42 Namzaric 28-10 mg Oral CSpX 1 cap once daily [Active]; aspirin 325 mg Oral tab 1 tab    tk1 

      once daily [Active]; donepezil 23 mg Oral tab 1 tab once daily [Active]; Fish Oil Oral      

      [Active]; levothyroxine 50 mcg tab 1 tab once daily [Active]; losartan 100 mg Oral tab      

      1 tab once daily [Active]; Magnesium Oxide Oral [Active]; metoprolol tartrate 25 mg         

      Oral tab 1 tab once daily [Active];                                                         

- PMHx:                                                                                           

00:42 Dementia; Hypertensive disorder; Hypothyroidism;                                        tk1 

                                                                                                  

- Immunization history:: Adult Immunizations up to date.                                          

- Social history:: Smoking status: Patient/guardian denies using tobacco, but has a               

  distant history of tobacco abuse.                                                               

                                                                                                  

                                                                                                  

Screenin:47 Abuse screen: Denies threats or abuse. Denies injuries from another. Nutritional        tk1 

      screening: No deficits noted. Tuberculosis screening: No symptoms or risk factors           

      identified. Fall Risk No fall in past 12 months (0 pts). Secondary diagnosis (15            

      points) dementia, IV access (20 points). Ambulatory Aid- None/Bed Rest/Nurse Assist (0      

      pts). Gait- Normal/Bed Rest/Wheelchair (0 pts) Mental Status- Overestimates/Forgets         

      Limitations (15 pts.). Sepsis Screening:.                                                   

                                                                                                  

Assessment:                                                                                       

00:47 Reassessment: See Triage assessment.                                                    tk1 

01:55 General: Appears in no apparent distress. Behavior is calm, cooperative. Pain: Denies   tk1 

      pain. Neuro: No deficits noted. Cardiovascular: No deficits noted. Heart tones S1 S2        

      present. Respiratory: No deficits noted. Airway is patent. GI: No deficits noted.           

02:30 Reassessment: Patient appears in no apparent distress at this time. No changes from     tk1 

      previously documented assessment. Patient denies pain at this time.                         

                                                                                                  

Vital Signs:                                                                                      

00:36  / 67 LA Supine (auto/reg); Pulse 75 MON; Resp 18 S; Pulse Ox 100% on 2 lpm NC;   tk1 

      Weight 52.16 kg; Height 5 ft. 5 in. (165.10 cm); Pain 0/10;                                 

00:42 Temp 98.3(O);                                                                           tk1 

00:47  / 61 RA Supine (auto/reg); Pulse 77 MON; Resp 18 S; Pulse Ox 99% ;               tk1 

01:55  / 61 RA Supine (auto/reg); Pulse 59 MON; Resp 18; Pulse Ox 99% on 2 lpm NC;      tk1 

02:45  / 65 LA Supine (auto/reg); Pulse 60 MON; Resp 16; Pulse Ox 100% on 2 lpm NC;     tk1 

      Pain 0/10;                                                                                  

00:36 Body Mass Index 19.14 (52.16 kg, 165.10 cm)                                             tk1 

                                                                                                  

Vitals:                                                                                           

00:47 Cardiac Rhythm Assessment Regular Sinus rhythm.                                         tk1 

01:55 Cardiac Rhythm Assessment Regular Sinus rhythm.                                         tk1 

02:45 Cardiac Rhythm Assessment Regular Sinus rhythm.                                         tk1 

                                                                                                  

ED Course:                                                                                        

00:20 Patient arrived in ED.                                                                  mw2 

00:22 Brian Rooney NP is PHCP.                                                           pm1 

00:22 Joe Diaz MD is Attending Physician.                                             pm1 

00:35 Nhung Small is Primary Nurse.                                                         tk1 

00:42 Triage completed.                                                                       tk1 

00:42 Arm band placed on right wrist.                                                         tk1 

00:47 Patient has correct armband on for positive identification. Bed in low position. Call   tk1 

      light in reach. Side rails up X2. Adult w/ patient. Cardiac monitor on. Pulse ox on.        

      NIBP on.                                                                                    

00:47 No provider procedures requiring assistance completed. Inserted saline lock: 20 gauge   tk1 

      in right forearm, using aseptic technique.                                                  

01:37 Extrem Venous W Compression Sammy US In Process Unspecified.                              EDMS

01:45 Abdomen 1 View (KUB) XRAY In Process Unspecified.                                       EDMS

02:45 IV discontinued, intact, bleeding controlled, No redness/swelling at site. Pressure     tk1 

      dressing applied.                                                                           

                                                                                                  

Administered Medications:                                                                         

No medications were administered                                                                  

                                                                                                  

                                                                                                  

Outcome:                                                                                          

02:40 Discharge ordered by MD.                                                                pm1 

03:09 Discharged to home via wheelchair, with family.                                         tk1 

03:09 Condition: stable                                                                           

03:09 Discharge instructions given to patient, family, Instructed on discharge instructions,      

      follow up and referral plans. medication usage, Demonstrated understanding of               

      instructions, follow-up care, medications.                                                  

03:09 Patient left the ED.                                                                    tk1 

                                                                                                  

Signatures:                                                                                       

Dispatcher [employer]                           EDMS                                                 

Brian Rooney NP                    NP   pm1                                                  

Kristina Stephenson                            mw2                                                  

Nhung Small                                tk1                                                  

                                                                                                  

**************************************************************************************************

## 2022-02-01 NOTE — XMS REPORT
Continuity of Care Document

                           Created on:2022



Patient:KARIE GUZMAN

Sex:Female

:1945

External Reference #:[id number]





Demographics







                          Address                   [address]



                                                    [place] [address]

 

                          Home Phone                [phone]

 

                          Mobile Phone              [phone]

 

                          Email Address             [email or web address]

 

                          Preferred Language        English

 

                          Marital Status            Unknown

 

                          Congregation Affiliation     Unknown

 

                          Race                      Unknown

 

                          Additional Race(s)        White



                                                    Unavailable

 

                          Ethnic Group              Unknown









Author







                          Organization              [facility]

t

 

                          Address                   [address]



                                                    [place] [address]

 

                          Phone                     [phone]









Support







                Name            Relationship    Address         Phone

 

                Marie           Child           [address]    [phone]



                                                [place] [address] 

 

                Jean-Paul          Child           Unavailable     [phone]









Care Team Providers







                    Name                Role                Phone

 

                    Danii Bass  Attending Clinician [phone]

 

                    Doctor Unassigned,  Name Attending Clinician Unavailable









Problems

This patient has no known problems.



Allergies, Adverse Reactions, Alerts

This patient has no known allergies or adverse reactions.



Medications

This patient has no known medications.



Procedures

This patient has no known procedures.



Encounters







        Start   End     Encounter Admission Attending Care    Care    Encounter 

Source



        Date/Time Date/Time Type    Type    Clinicians Facility Department ID   

   

 

        2019 Emergency         PAMELLA [facility]and [facility]    [id number]

[id number] 



        11:17:15 13:29:00                 Danii Campuzano [id number]         



                                                [place] [id number]         



                                                [place]  [phone]         



                                                        084             

 

        2019 Orders          Doctor JAY    [id number]

[id number] 



        00:00:00 00:00:00 Only            UnassignedCHRIS   [id number]       

  



                                        [place] \[facility]\"" [id number]         



                                                        [phone]         



                                                        009             







Results

This patient has no known results.

## 2022-02-01 NOTE — EDPHYS
Physician Documentation                                                                           

 [facility]                                                                 

Name: Re Jeong                                                                                

Age: 76 yrs                                                                                       

Sex: Female                                                                                       

: 1945                                                                                   

MRN: [id number]                                                                                   

Arrival Date: 2022                                                                          

Time: 00:20                                                                                       

Account#: [id number]                                                                            

Bed 15                                                                                            

Private MD:                                                                                       

SEAN Physician Joe Diaz                                                                      

HPI:                                                                                              

                                                                                             

01:09 This 76 yrs old Female presents to ER via EMS with complaints of constipation.          pm1 

01:09 The patient presents with Constipation. Onset: The symptoms/episode began/occurred 5    pm1 

      day(s) ago. Associated signs and symptoms: Pertinent negatives: nausea, vomiting, and       

      diarrhea, decreased appetite. The patient has not experienced similar symptoms in the       

      past. The patient has been recently been admitted at [facility],     

      by Dr. Cuevas and discharged home 5 days ago. Patient was brought to the ER today with       

      complaints of left leg pain. Patient would wake up with complaints of left leg pain in      

      the middle of the night. Son is concerned that patient possibly has a DVT. Patient also     

      has not had a bowel movement since discharge from the hospital 5 days ago. No issues        

      with urination. Her son is currently planning with her PCP for placement in a nursing       

      home that can handle her dementia.                                                          

                                                                                                  

Historical:                                                                                       

- Home Meds:                                                                                      

00:42 Namzaric 28-10 mg Oral CSpX 1 cap once daily [Active]; aspirin 325 mg Oral tab 1 tab    tk1 

      once daily [Active]; donepezil 23 mg Oral tab 1 tab once daily [Active]; Fish Oil Oral      

      [Active]; levothyroxine 50 mcg tab 1 tab once daily [Active]; losartan 100 mg Oral tab      

      1 tab once daily [Active]; Magnesium Oxide Oral [Active]; metoprolol tartrate 25 mg         

      Oral tab 1 tab once daily [Active];                                                         

- PMHx:                                                                                           

00:42 Dementia; Hypertensive disorder; Hypothyroidism;                                        tk1 

                                                                                                  

- Immunization history:: Adult Immunizations up to date.                                          

- Social history:: Smoking status: Patient/guardian denies using tobacco, but has a               

  distant history of tobacco abuse.                                                               

                                                                                                  

                                                                                                  

ROS:                                                                                              

01:09 Constitutional: Negative for fever, chills, and weight loss, Cardiovascular: Negative   pm1 

      for chest pain, palpitations, and edema, Respiratory: Negative for shortness of breath,     

      cough, wheezing, and pleuritic chest pain.                                                  

01:09 Back: Negative for injury and pain.                                                         

01:09 Skin: Negative for injury, rash, and discoloration, Neuro: Negative for headache,           

      weakness, numbness, tingling, and seizure.                                                  

01:09 Abdomen/GI: Positive for constipation, Negative for abdominal pain, nausea, vomiting,       

      and diarrhea.                                                                               

01:09 MS/extremity: Positive for Left leg pain.                                                   

01:09 All other systems are negative.                                                             

                                                                                                  

Exam:                                                                                             

01:09 Constitutional:  This is a well developed, well nourished patient who is awake, alert,  pm1 

      and in no acute distress. Head/Face:  Normocephalic, atraumatic.                            

01:09 Skin:  Warm, dry with normal turgor.  Normal color with no rashes, no lesions, and no       

      evidence of cellulitis. MS/ Extremity:  Pulses equal, no cyanosis.  Neurovascular           

      intact.  Full, normal range of motion.  No palpable pain present.  Muscle wasting           

      present to lower extremity                                                                  

01:09 Cardiovascular: Exam negative for  acute changes, Rate: normal, Rhythm: regular,            

      Pulses: no pulse deficits are appreciated.                                                  

01:09 Respiratory: Exam negative for  acute changes, the patient does not display signs of        

      respiratory distress,  Respirations: no acute changes, is not noted, Breath sounds: are     

      clear throughout.                                                                           

01:09 Abdomen/GI: Inspection: abdomen appears normal, Palpation: abdomen is soft and              

      non-tender, in all quadrants.                                                               

01:09 Neuro: Exam negative for acute changes, Orientation: is normal, Mentation: is normal.       

                                                                                                  

Vital Signs:                                                                                      

00:36  / 67 LA Supine (auto/reg); Pulse 75 MON; Resp 18 S; Pulse Ox 100% on 2 lpm NC;   tk1 

      Weight 52.16 kg; Height 5 ft. 5 in. (165.10 cm); Pain 0/10;                                 

00:42 Temp 98.3(O);                                                                           tk1 

00:47  / 61 RA Supine (auto/reg); Pulse 77 MON; Resp 18 S; Pulse Ox 99% ;               tk1 

01:55  / 61 RA Supine (auto/reg); Pulse 59 MON; Resp 18; Pulse Ox 99% on 2 lpm NC;      tk1 

02:45  / 65 LA Supine (auto/reg); Pulse 60 MON; Resp 16; Pulse Ox 100% on 2 lpm NC;     tk1 

      Pain 0/10;                                                                                  

00:36 Body Mass Index 19.14 (52.16 kg, 165.10 cm)                                             tk1 

                                                                                                  

MDM:                                                                                              

00:22 Patient medically screened.                                                             [facility] 

02:39 Data reviewed: vital signs. Data interpreted: Pulse oximetry: on room air is 99 %.      pm1 

      Interpretation: normal. Counseling: I had a detailed discussion with the patient and/or     

      guardian regarding: the historical points, exam findings, and any diagnostic results        

      supporting the discharge/admit diagnosis, radiology results, the need for outpatient        

      follow up, to return to the emergency department if symptoms worsen or persist or if        

      there are any questions or concerns that arise at home.                                     

02:39 ED course: Offered digital rectal examination and possible stool removal but            pm1 

      recommended suppository since the patient has dementia. Patient wants to try PO             

      laxatives prior to disimpaction. Discussed return precautions.                              

02:39 ED course: Patient sleeping comfortably in bed without any complaints of pain through   pm1 

      ER visit. Discussed comfort measures with son.                                              

                                                                                                  

                                                                                             

01:02 Order name: Extrem Venous W Compression Sammy US                                          pm1 

                                                                                             

01:12 Order name: Abdomen 1 View (KUB) XRAY                                                   pm1 

                                                                                                  

Administered Medications:                                                                         

No medications were administered                                                                  

                                                                                                  

                                                                                                  

Disposition:                                                                                      

15:18 Co-signature as Attending Physician, Joe Diaz MD I agree with the assessment and  [facility] 

      plan of care.                                                                               

                                                                                                  

Disposition Summary:                                                                              

22 02:40                                                                                    

Discharge Ordered                                                                                 

      Location: Home                                                                          pm1 

      Problem: new                                                                            pm1 

      Symptoms: have improved                                                                 pm1 

      Condition: Stable                                                                       pm1 

      Diagnosis                                                                                   

        - Constipation, unspecified                                                           pm1 

      Followup:                                                                               pm1 

        - With: Emergency Department                                                               

        - When: As needed                                                                          

        - Reason: Worsening of condition                                                           

      Followup:                                                                               pm1 

        - With: Private Physician                                                                  

        - When: 2 - 3 days                                                                         

        - Reason: Recheck today's complaints, Continuance of care, Re-evaluation by your           

      physician                                                                                   

      Discharge Instructions:                                                                     

        - Discharge Summary Sheet                                                             pm1 

        - Constipation, Adult                                                                 pm1 

      Forms:                                                                                      

        - Medication Reconciliation Form                                                      pm1 

        - Thank You Letter                                                                    pm1 

        - Antibiotic Education                                                                pm1 

        - Prescription Opioid Use                                                             pm1 

      Prescriptions:                                                                              

        - Miralax 17 gram Oral powder in packet                                                    

            - take 1 packet by ORAL route once daily As needed; 7 packet; Refills: 0, Product pm1 

      Selection Permitted                                                                         

Signatures:                                                                                       

Dispatcher [employer]                           Joe Law MD MD cha Marinas, Patrick, NP                    NP   pm1                                                  

Nhung Small                                tk1                                                  

                                                                                                  

**************************************************************************************************